# Patient Record
Sex: MALE | Race: OTHER | NOT HISPANIC OR LATINO
[De-identification: names, ages, dates, MRNs, and addresses within clinical notes are randomized per-mention and may not be internally consistent; named-entity substitution may affect disease eponyms.]

---

## 2018-07-09 ENCOUNTER — APPOINTMENT (OUTPATIENT)
Dept: CARDIOLOGY | Facility: CLINIC | Age: 48
End: 2018-07-09

## 2018-07-09 VITALS — SYSTOLIC BLOOD PRESSURE: 140 MMHG | HEART RATE: 72 BPM | DIASTOLIC BLOOD PRESSURE: 100 MMHG | RESPIRATION RATE: 18 BRPM

## 2018-07-09 VITALS — BODY MASS INDEX: 33.04 KG/M2 | HEIGHT: 71 IN | WEIGHT: 236 LBS

## 2018-07-09 DIAGNOSIS — Z87.09 PERSONAL HISTORY OF OTHER DISEASES OF THE RESPIRATORY SYSTEM: ICD-10-CM

## 2018-07-09 DIAGNOSIS — Z78.9 OTHER SPECIFIED HEALTH STATUS: ICD-10-CM

## 2018-07-09 DIAGNOSIS — Z82.49 FAMILY HISTORY OF ISCHEMIC HEART DISEASE AND OTHER DISEASES OF THE CIRCULATORY SYSTEM: ICD-10-CM

## 2018-07-09 DIAGNOSIS — F17.290 NICOTINE DEPENDENCE, OTHER TOBACCO PRODUCT, UNCOMPLICATED: ICD-10-CM

## 2018-07-09 PROBLEM — Z00.00 ENCOUNTER FOR PREVENTIVE HEALTH EXAMINATION: Status: ACTIVE | Noted: 2018-07-09

## 2018-07-16 ENCOUNTER — APPOINTMENT (OUTPATIENT)
Dept: CARDIOLOGY | Facility: CLINIC | Age: 48
End: 2018-07-16

## 2018-08-03 ENCOUNTER — APPOINTMENT (OUTPATIENT)
Dept: CARDIOLOGY | Facility: CLINIC | Age: 48
End: 2018-08-03

## 2018-08-03 ENCOUNTER — TRANSCRIPTION ENCOUNTER (OUTPATIENT)
Age: 48
End: 2018-08-03

## 2018-09-19 ENCOUNTER — APPOINTMENT (OUTPATIENT)
Dept: CARDIOLOGY | Facility: CLINIC | Age: 48
End: 2018-09-19

## 2018-09-19 VITALS — WEIGHT: 242 LBS | BODY MASS INDEX: 33.88 KG/M2 | HEIGHT: 71 IN

## 2018-09-19 VITALS — RESPIRATION RATE: 18 BRPM | SYSTOLIC BLOOD PRESSURE: 130 MMHG | HEART RATE: 88 BPM | DIASTOLIC BLOOD PRESSURE: 86 MMHG

## 2019-02-01 LAB
ALBUMIN SERPL ELPH-MCNC: 4.4 G/DL
ALP BLD-CCNC: 85 U/L
ALT SERPL-CCNC: 36 U/L
ANION GAP SERPL CALC-SCNC: 12 MMOL/L
AST SERPL-CCNC: 26 U/L
BILIRUB SERPL-MCNC: 0.3 MG/DL
BUN SERPL-MCNC: 14 MG/DL
CALCIUM SERPL-MCNC: 9.9 MG/DL
CHLORIDE SERPL-SCNC: 102 MMOL/L
CHOLEST SERPL-MCNC: 235 MG/DL
CHOLEST/HDLC SERPL: 4.2 RATIO
CO2 SERPL-SCNC: 26 MMOL/L
CREAT SERPL-MCNC: 1.08 MG/DL
GLUCOSE SERPL-MCNC: 111 MG/DL
HDLC SERPL-MCNC: 56 MG/DL
LDLC SERPL CALC-MCNC: 166 MG/DL
POTASSIUM SERPL-SCNC: 5 MMOL/L
PROT SERPL-MCNC: 7.5 G/DL
SODIUM SERPL-SCNC: 140 MMOL/L
TRIGL SERPL-MCNC: 65 MG/DL

## 2019-02-04 ENCOUNTER — APPOINTMENT (OUTPATIENT)
Dept: CARDIOLOGY | Facility: CLINIC | Age: 49
End: 2019-02-04

## 2019-02-04 VITALS — DIASTOLIC BLOOD PRESSURE: 80 MMHG | RESPIRATION RATE: 18 BRPM | HEART RATE: 72 BPM | SYSTOLIC BLOOD PRESSURE: 130 MMHG

## 2019-02-04 VITALS — WEIGHT: 237 LBS | BODY MASS INDEX: 33.18 KG/M2 | HEIGHT: 71 IN

## 2019-02-04 NOTE — PHYSICAL EXAM
[General Appearance - Well Developed] : well developed [Normal Appearance] : normal appearance [Well Groomed] : well groomed [General Appearance - Well Nourished] : well nourished [No Deformities] : no deformities [General Appearance - In No Acute Distress] : no acute distress [Normal Conjunctiva] : the conjunctiva exhibited no abnormalities [Eyelids - No Xanthelasma] : the eyelids demonstrated no xanthelasmas [Normal Oral Mucosa] : normal oral mucosa [No Oral Pallor] : no oral pallor [No Oral Cyanosis] : no oral cyanosis [Normal Jugular Venous A Waves Present] : normal jugular venous A waves present [Normal Jugular Venous V Waves Present] : normal jugular venous V waves present [No Jugular Venous Gannon A Waves] : no jugular venous gannon A waves [Respiration, Rhythm And Depth] : normal respiratory rhythm and effort [Exaggerated Use Of Accessory Muscles For Inspiration] : no accessory muscle use [Auscultation Breath Sounds / Voice Sounds] : lungs were clear to auscultation bilaterally [Heart Rate And Rhythm] : heart rate and rhythm were normal [Heart Sounds] : normal S1 and S2 [Murmurs] : no murmurs present [Bowel Sounds] : normal bowel sounds [Abdomen Soft] : soft [Abdomen Tenderness] : non-tender [Abdomen Mass (___ Cm)] : no abdominal mass palpated [Abnormal Walk] : normal gait [Gait - Sufficient For Exercise Testing] : the gait was sufficient for exercise testing [Nail Clubbing] : no clubbing of the fingernails [Cyanosis, Localized] : no localized cyanosis [Petechial Hemorrhages (___cm)] : no petechial hemorrhages [Skin Color & Pigmentation] : normal skin color and pigmentation [] : no rash [No Venous Stasis] : no venous stasis [Skin Lesions] : no skin lesions [No Skin Ulcers] : no skin ulcer [No Xanthoma] : no  xanthoma was observed [Oriented To Time, Place, And Person] : oriented to person, place, and time

## 2019-04-02 ENCOUNTER — TRANSCRIPTION ENCOUNTER (OUTPATIENT)
Age: 49
End: 2019-04-02

## 2019-04-19 ENCOUNTER — MEDICATION RENEWAL (OUTPATIENT)
Age: 49
End: 2019-04-19

## 2019-07-23 ENCOUNTER — APPOINTMENT (OUTPATIENT)
Dept: CARDIOLOGY | Facility: CLINIC | Age: 49
End: 2019-07-23
Payer: COMMERCIAL

## 2019-07-23 VITALS — WEIGHT: 232 LBS | HEIGHT: 71 IN | BODY MASS INDEX: 32.48 KG/M2

## 2019-07-23 VITALS — BODY MASS INDEX: 32.48 KG/M2 | WEIGHT: 232 LBS | HEIGHT: 71 IN

## 2019-07-23 VITALS — RESPIRATION RATE: 18 BRPM | DIASTOLIC BLOOD PRESSURE: 80 MMHG | SYSTOLIC BLOOD PRESSURE: 120 MMHG | HEART RATE: 76 BPM

## 2019-07-23 PROCEDURE — 93000 ELECTROCARDIOGRAM COMPLETE: CPT

## 2019-07-23 PROCEDURE — 99214 OFFICE O/P EST MOD 30 MIN: CPT

## 2019-07-23 NOTE — PHYSICAL EXAM
[General Appearance - Well Developed] : well developed [Normal Appearance] : normal appearance [Well Groomed] : well groomed [General Appearance - Well Nourished] : well nourished [No Deformities] : no deformities [Normal Conjunctiva] : the conjunctiva exhibited no abnormalities [General Appearance - In No Acute Distress] : no acute distress [Eyelids - No Xanthelasma] : the eyelids demonstrated no xanthelasmas [Normal Oral Mucosa] : normal oral mucosa [No Oral Pallor] : no oral pallor [No Oral Cyanosis] : no oral cyanosis [Normal Jugular Venous A Waves Present] : normal jugular venous A waves present [Normal Jugular Venous V Waves Present] : normal jugular venous V waves present [No Jugular Venous Gannon A Waves] : no jugular venous gannon A waves [Heart Rate And Rhythm] : heart rate and rhythm were normal [Heart Sounds] : normal S1 and S2 [Murmurs] : no murmurs present [Respiration, Rhythm And Depth] : normal respiratory rhythm and effort [Exaggerated Use Of Accessory Muscles For Inspiration] : no accessory muscle use [Bowel Sounds] : normal bowel sounds [Auscultation Breath Sounds / Voice Sounds] : lungs were clear to auscultation bilaterally [Abdomen Soft] : soft [Abdomen Mass (___ Cm)] : no abdominal mass palpated [Abdomen Tenderness] : non-tender [Gait - Sufficient For Exercise Testing] : the gait was sufficient for exercise testing [Abnormal Walk] : normal gait [Petechial Hemorrhages (___cm)] : no petechial hemorrhages [Nail Clubbing] : no clubbing of the fingernails [Cyanosis, Localized] : no localized cyanosis [Skin Color & Pigmentation] : normal skin color and pigmentation [] : no rash [No Venous Stasis] : no venous stasis [Skin Lesions] : no skin lesions [No Skin Ulcers] : no skin ulcer [No Xanthoma] : no  xanthoma was observed [Oriented To Time, Place, And Person] : oriented to person, place, and time

## 2019-10-24 ENCOUNTER — APPOINTMENT (OUTPATIENT)
Dept: CARDIOLOGY | Facility: CLINIC | Age: 49
End: 2019-10-24
Payer: COMMERCIAL

## 2019-10-24 VITALS — HEART RATE: 80 BPM | SYSTOLIC BLOOD PRESSURE: 136 MMHG | DIASTOLIC BLOOD PRESSURE: 90 MMHG | RESPIRATION RATE: 18 BRPM

## 2019-10-24 VITALS — HEIGHT: 71 IN | BODY MASS INDEX: 33.74 KG/M2 | WEIGHT: 241 LBS

## 2019-10-24 PROCEDURE — 99213 OFFICE O/P EST LOW 20 MIN: CPT

## 2019-10-24 PROCEDURE — 93000 ELECTROCARDIOGRAM COMPLETE: CPT

## 2019-10-24 RX ORDER — VALSARTAN 80 MG/1
80 TABLET, COATED ORAL DAILY
Qty: 90 | Refills: 3 | Status: DISCONTINUED | COMMUNITY
Start: 1900-01-01 | End: 2019-10-24

## 2019-10-24 NOTE — PHYSICAL EXAM
[Normal Appearance] : normal appearance [General Appearance - Well Developed] : well developed [Well Groomed] : well groomed [General Appearance - Well Nourished] : well nourished [No Deformities] : no deformities [General Appearance - In No Acute Distress] : no acute distress [Normal Conjunctiva] : the conjunctiva exhibited no abnormalities [Eyelids - No Xanthelasma] : the eyelids demonstrated no xanthelasmas [Normal Oral Mucosa] : normal oral mucosa [No Oral Pallor] : no oral pallor [No Oral Cyanosis] : no oral cyanosis [Normal Jugular Venous A Waves Present] : normal jugular venous A waves present [Normal Jugular Venous V Waves Present] : normal jugular venous V waves present [No Jugular Venous Gannon A Waves] : no jugular venous gannon A waves [Heart Rate And Rhythm] : heart rate and rhythm were normal [Heart Sounds] : normal S1 and S2 [Murmurs] : no murmurs present [Respiration, Rhythm And Depth] : normal respiratory rhythm and effort [Auscultation Breath Sounds / Voice Sounds] : lungs were clear to auscultation bilaterally [Exaggerated Use Of Accessory Muscles For Inspiration] : no accessory muscle use [Abdomen Soft] : soft [Abdomen Tenderness] : non-tender [Bowel Sounds] : normal bowel sounds [Abdomen Mass (___ Cm)] : no abdominal mass palpated [Abnormal Walk] : normal gait [Gait - Sufficient For Exercise Testing] : the gait was sufficient for exercise testing [Nail Clubbing] : no clubbing of the fingernails [Cyanosis, Localized] : no localized cyanosis [Petechial Hemorrhages (___cm)] : no petechial hemorrhages [Skin Color & Pigmentation] : normal skin color and pigmentation [Skin Lesions] : no skin lesions [No Venous Stasis] : no venous stasis [] : no rash [Oriented To Time, Place, And Person] : oriented to person, place, and time [No Skin Ulcers] : no skin ulcer [No Xanthoma] : no  xanthoma was observed

## 2019-12-18 ENCOUNTER — APPOINTMENT (OUTPATIENT)
Dept: CARDIOLOGY | Facility: CLINIC | Age: 49
End: 2019-12-18

## 2020-03-16 ENCOUNTER — OUTPATIENT (OUTPATIENT)
Dept: OUTPATIENT SERVICES | Facility: HOSPITAL | Age: 50
LOS: 1 days | Discharge: HOME | End: 2020-03-16
Payer: COMMERCIAL

## 2020-03-16 DIAGNOSIS — R10.10 UPPER ABDOMINAL PAIN, UNSPECIFIED: ICD-10-CM

## 2020-03-16 PROCEDURE — 74176 CT ABD & PELVIS W/O CONTRAST: CPT | Mod: 26

## 2020-04-27 ENCOUNTER — APPOINTMENT (OUTPATIENT)
Dept: NEUROLOGY | Facility: CLINIC | Age: 50
End: 2020-04-27
Payer: COMMERCIAL

## 2020-04-27 DIAGNOSIS — Z83.438 FAMILY HISTORY OF OTHER DISORDER OF LIPOPROTEIN METABOLISM AND OTHER LIPIDEMIA: ICD-10-CM

## 2020-04-27 DIAGNOSIS — Z82.3 FAMILY HISTORY OF STROKE: ICD-10-CM

## 2020-04-27 DIAGNOSIS — Z83.3 FAMILY HISTORY OF DIABETES MELLITUS: ICD-10-CM

## 2020-04-27 PROCEDURE — 99205 OFFICE O/P NEW HI 60 MIN: CPT | Mod: 95

## 2020-04-27 RX ORDER — VALSARTAN AND HYDROCHLOROTHIAZIDE 160; 12.5 MG/1; MG/1
160-12.5 TABLET, FILM COATED ORAL DAILY
Qty: 90 | Refills: 3 | Status: DISCONTINUED | COMMUNITY
Start: 2019-10-24 | End: 2020-04-27

## 2020-04-27 NOTE — ASSESSMENT
[FreeTextEntry1] : 49 year-old RH male referred by Dr. Anshu Hogan for evaluation of venous sinus thrombosis in early April.  Besides for low level intermittent headaches, he has no residual symptoms.  His neurological exam is essentially intact.\par \par Plan:\par 1) Agree with anticoagulation given the thrombotic nature of COVID, even though he's tested negative now.\par - Coumadin/INR monitoring as per his PMD.  Discussed food interactions with coumadin and purpose of INR checking.  Important to maintain INR 2-3.  Reassess need for coumadin after repeat cerebrovascular imaging.\par \par 2) Consider hypercoagulable labs to evaluate his personal risk factors for clotting besides COVID.\par - Awaiting summary of labs sent at [facility].\par \par 3) He should continue to monitor his personal risk factors for stroke including HTN, hyperlipidemia.\par - Deferred to his PMD in regard to antihypertensive regimen.  His blood pressure seems to have reasonable control on Amlodipine.\par - Continue Atorvastatin 40mg for statin.  Would appreciate recent lipid profile for LDL goal less than 70 given his multiple risk factors.\par - Discussed the improvements made to his diet.  Would appreciate recent hemoglobin A1C.\par \par 4) Encouraged daily exercise but cautioned regarding increasing intracranial pressure.  Continue bowel regimen to prevent extra pushing.\par \par Thank you for allowing me to participate in the care of your patient.  If you have any questions, please feel free to call me at [phone].

## 2020-04-27 NOTE — CONSULT LETTER
[Dear  ___] : Dear  [unfilled], [Consult Letter:] : I had the pleasure of evaluating your patient, [unfilled]. [FreeTextEntry2] : Anshu Hogan MD\par [address] [facility]\[place] [address]

## 2020-04-27 NOTE — PHYSICAL EXAM
[FreeTextEntry1] : 125/90 this morning\par \par General: sitting in chair comfortably, NAD\par Head/neck: no tenderness on palpation, FROM\par Eyes: anicteric sclera\par Extremities: FROMx4\par 			\par Neurological exam:	\par Mental status: AA&O x 3, fluent - able name, repeat, spontaneous speech, no dysarthria, follows complex commands across midline, able give full history of present and past events, memory intact, normal attention span, fund of knowledge appropriate\par Cranial nerves: EOMI, pupils equal, VFF, facial sensation intact, no facial droop, hearing grossly intact to finger snap bilaterally, shoulder shrug intact bilaterally, tongue midline\par Motor: No pronator drift, strength full, normal tone and bulk\par Sensory: Intact light touch bilaterally. Negative Romberg\par Cerebellar: Finger-nose intact bilaterally\par Gait: steady\par

## 2020-04-27 NOTE — DATA REVIEWED
[de-identified] : Summary based on reports read over the audiovisual system - \par MRI Brain - filling defects in multiple venous.  Intracerebral veins patent. Right fronto-parietal lobe hyperintensity, possibly edema, positive on DWI..\par MRV Head with phylicia - flow void within superior sagittal sinus - dural venous sinus thrombus

## 2020-04-27 NOTE — HISTORY OF PRESENT ILLNESS
[Home] : at home, [unfilled] , at the time of the visit. [Other Location: e.g. Home (Enter Location, City,State)___] : at [unfilled] [Patient] : the patient [Self] : self [FreeTextEntry2] : Mr. Mock [FreeTextEntry1] : 49 year-old RH male referred by Dr. Anshu Hogan for evaluation of venous sinus thrombosis in early April.  He passed out at home and was taken to [facility] for workup including MRI Brain, MRV Head that showed clot in his venous sinuses.  He denies having any specific symptoms including no weakness, numbness or visual deficits.  He didn't have a headache while in the hospital.  He was started on Lovenox and coumadin.  This was in setting of positive COVID complicated by PNA and kidney issues. He's never had clots before.  He's not sure what types of blood work were done in the hospital.\par \par He's following with his PMD, latest INR 1.8 4/14 with next blood work tomorrow.  \par \par Since discharge, he's had some fatigue with general weakness and sleeps a lot.  He has some stiffness in the joints of both hands and possibly his knees.  He's been having intermittent headaches mostly at his temple, rating 5/10, but no associated symptoms such as nausea or vomiting.  He treats with Tylenol with reasonable resolution.  They are worse at night and when he wakes in the morning.  He hasn't returned to his job at [employer].\par \par PMH: HTN - Valsartan and water pill - Switched recently to Amlodipine due to kidney disease\par - Hyperlipidemia - Atorvastatin 40mg qhs - unchanged since before.  LDL was 160 in 1/2019.\par Denies DM though Hemoglobin A1C was 6.8%in 1/2019.\par - Asthma - Singulair 10mg, \par - Zantac 150mg\par - Diverticulitis - healthier diet with chicken and turkey\par PSH: none\par All: broccoli\par SH: cigar rarely\par FH: dad - CVAs in late 70's, high cholesterol, DM\par - several others with DM, high cholesterol

## 2020-04-28 ENCOUNTER — TRANSCRIPTION ENCOUNTER (OUTPATIENT)
Age: 50
End: 2020-04-28

## 2020-07-01 ENCOUNTER — APPOINTMENT (OUTPATIENT)
Dept: CARDIOLOGY | Facility: CLINIC | Age: 50
End: 2020-07-01
Payer: COMMERCIAL

## 2020-07-01 VITALS — BODY MASS INDEX: 34.44 KG/M2 | WEIGHT: 246 LBS | TEMPERATURE: 98 F | HEIGHT: 71 IN

## 2020-07-01 VITALS — DIASTOLIC BLOOD PRESSURE: 80 MMHG | RESPIRATION RATE: 18 BRPM | SYSTOLIC BLOOD PRESSURE: 130 MMHG | HEART RATE: 92 BPM

## 2020-07-01 PROCEDURE — 93000 ELECTROCARDIOGRAM COMPLETE: CPT

## 2020-07-01 PROCEDURE — 99214 OFFICE O/P EST MOD 30 MIN: CPT

## 2020-07-01 NOTE — PHYSICAL EXAM
[General Appearance - Well Developed] : well developed [Normal Appearance] : normal appearance [Well Groomed] : well groomed [General Appearance - Well Nourished] : well nourished [No Deformities] : no deformities [General Appearance - In No Acute Distress] : no acute distress [Normal Conjunctiva] : the conjunctiva exhibited no abnormalities [Normal Oral Mucosa] : normal oral mucosa [Eyelids - No Xanthelasma] : the eyelids demonstrated no xanthelasmas [Normal Jugular Venous A Waves Present] : normal jugular venous A waves present [No Oral Cyanosis] : no oral cyanosis [No Oral Pallor] : no oral pallor [Normal Jugular Venous V Waves Present] : normal jugular venous V waves present [No Jugular Venous Gannon A Waves] : no jugular venous gannon A waves [Heart Rate And Rhythm] : heart rate and rhythm were normal [Murmurs] : no murmurs present [Heart Sounds] : normal S1 and S2 [Respiration, Rhythm And Depth] : normal respiratory rhythm and effort [Exaggerated Use Of Accessory Muscles For Inspiration] : no accessory muscle use [Auscultation Breath Sounds / Voice Sounds] : lungs were clear to auscultation bilaterally [Abdomen Tenderness] : non-tender [Bowel Sounds] : normal bowel sounds [Abdomen Soft] : soft [Abnormal Walk] : normal gait [Abdomen Mass (___ Cm)] : no abdominal mass palpated [Nail Clubbing] : no clubbing of the fingernails [Gait - Sufficient For Exercise Testing] : the gait was sufficient for exercise testing [Cyanosis, Localized] : no localized cyanosis [Petechial Hemorrhages (___cm)] : no petechial hemorrhages [No Venous Stasis] : no venous stasis [] : no rash [Skin Color & Pigmentation] : normal skin color and pigmentation [No Skin Ulcers] : no skin ulcer [Skin Lesions] : no skin lesions [Oriented To Time, Place, And Person] : oriented to person, place, and time [No Xanthoma] : no  xanthoma was observed

## 2020-07-20 ENCOUNTER — APPOINTMENT (OUTPATIENT)
Dept: NEUROLOGY | Facility: CLINIC | Age: 50
End: 2020-07-20
Payer: COMMERCIAL

## 2020-07-20 VITALS
HEIGHT: 71 IN | HEART RATE: 96 BPM | TEMPERATURE: 97.7 F | OXYGEN SATURATION: 95 % | BODY MASS INDEX: 34.3 KG/M2 | WEIGHT: 245 LBS | DIASTOLIC BLOOD PRESSURE: 94 MMHG | SYSTOLIC BLOOD PRESSURE: 139 MMHG

## 2020-07-20 PROCEDURE — 99214 OFFICE O/P EST MOD 30 MIN: CPT

## 2020-07-20 RX ORDER — WARFARIN SODIUM 5 MG/1
5 TABLET ORAL
Refills: 0 | Status: ACTIVE | COMMUNITY

## 2020-07-20 NOTE — ASSESSMENT
[FreeTextEntry1] : 50 year-old RH male presents for follow up for venous sinus thrombosis in early April.  No residual symptoms.  His neurological exam is essentially intact.\par \par Plan for venous sinus thrombosis:\par 1) Awaiting results of follow up MRV.  The sagital sinus seems to be patent to my eye but awaiting official results from [facility].\par 2) He should continue anticoagulation given the thrombotic nature of COVID.  Coumadin/INR monitoring as per his PMD. \par  \par 3) Discussed purpose of hypercoagulable labs since he doesn't have many typical stroke risk factor.  Would prefer to have them done off anticoagulation so awaiting official results.  If the venous sinus thrombosis has resolved, then would discontinue coumadin and switch to Aspirin.  Would have the labs drawn 4 days later and then reconsider antiplatelet versus anticoagulation depending on the results of the genetic lab work.\par \par 4) Congratulated him on his improvements in regard to HTN, hyperlipidemia and diabetes.  His numbers have improved dramatically with lifestyle adjustments.  \par - Continue Atorvastatin 40mg for statin. \par \par Plan for hand numbness that seems compatible with carpal tunnel syndrome - Recommended trial of wrist splints worn at night to decrease pressure on median nerve.  Discussed anatomy of carpal tunnel and purpose of treatment.

## 2020-07-20 NOTE — HISTORY OF PRESENT ILLNESS
[FreeTextEntry1] : 50 year-old RH male presents for follow up for venous sinus thrombosis in early April.  He's been doing well since last visit with only random headaches.  No weakness in arms or legs.  Minimal numbness in both of his hands 2-3 times=per-week.  The feeling resolves when he shakes his hands.  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  LDL was 160 in 1/2019.\par - Denies DM though Hemoglobin A1C was 6.8%in 1/2019.  He changed his eating habits for no chaudhari foods, etc with improved A1C 4.9%\par \par SH: cigar rarely\par FH: dad - CVAs in late 70's, high cholesterol, DM\par - several others with DM, high cholesterol

## 2020-07-20 NOTE — PHYSICAL EXAM
[FreeTextEntry1] : General: sitting in exam chair comfortably, NAD\par Head/neck: no tenderness on palpation, FROM\par Eyes: anicteric sclera\par Extremities: FROMx4, 2+ radial pulses bilaterally, hardened tendon at lateral left wrist without tenderness\par - reproducible numbness in his finger tips with rotation around his wrists, Tinel's possibly positive on left\par 			\par Neurological exam:	\par Mental status: AA&O x 3, fluent - able name, repeat, spontaneous speech, no dysarthria, follows complex commands across midline, able give full history of present and past events, memory intact, normal attention span, fund of knowledge appropriate\par Cranial nerves: EOMI, pupils equal, VFF, facial sensation intact, no facial droop, hearing grossly intact to finger snap bilaterally, shoulder shrug intact bilaterally, tongue midline\par Motor: No pronator drift, strength full, normal tone and bulk\par Sensory: Intact light touch bilaterally. Negative Romberg\par Cerebellar: Finger-nose intact bilaterally\par Gait: steady\par

## 2020-07-20 NOTE — DATA REVIEWED
[de-identified] : Labs (6/11/2020): \par CBC, CMP - WNL\par Lipid profile: LDL 80, HDL 60, Triglycerides 106, Total 159\par TSH 1.58\par Vitamin 418, folate 16.1\par Hemoglobin A1C 4.9%

## 2020-08-11 ENCOUNTER — APPOINTMENT (OUTPATIENT)
Dept: NEUROLOGY | Facility: CLINIC | Age: 50
End: 2020-08-11

## 2020-08-17 ENCOUNTER — APPOINTMENT (OUTPATIENT)
Dept: NEUROLOGY | Facility: CLINIC | Age: 50
End: 2020-08-17
Payer: COMMERCIAL

## 2020-08-17 DIAGNOSIS — G56.03 CARPAL TUNNEL SYNDROM,BILATERAL UPPER LIMBS: ICD-10-CM

## 2020-08-17 PROCEDURE — 99214 OFFICE O/P EST MOD 30 MIN: CPT | Mod: 95

## 2020-08-17 NOTE — ASSESSMENT
[FreeTextEntry1] : 50 year-old RH male presents for follow up for venous sinus thrombosis in early April.  No residual symptoms besides stiffness and minimal numbness in his hands.  His neurological exam is essentially intact with some question of carpal tunnel syndrome.\par \par Plan for venous sinus thrombosis:\par 1) Reviewed results of his repeat MRV with him with conclusion that the vessels are open but he still has some narrowing within his venous system.  Therefore, recommended that he continue anticoagulation for a full six months and then consider switch to antiplatelet.\par 2) He should follow with his PMD for coumadin/INR monitoring, INR 2-3.\par 3) Ordered hypercoagulable labs to rule out genetic and personal clotting risks, besides COVID.\par - Instructed him to hold coumadin for 3 nights with testing on third day to eliminate interference of anticoagulation with the results of the labs.  He should be more vigilant in terms of stroke symptoms while he's off anticoagulation.\par \par 4) Continue Atorvastatin 40mg for statin. \par 5) Smoking cessation.\par \par Plan for hand numbness that seems compatible with carpal tunnel syndrome - Recommended trial of wrist splints worn at night to decrease pressure on median nerve, rather than it being post stroke.

## 2020-08-17 NOTE — DATA REVIEWED
[No studies available for review at this time.] : No studies available for review at this time. [de-identified] : R

## 2020-08-17 NOTE — PHYSICAL EXAM
[FreeTextEntry1] : General: sitting in exam chair comfortably, NAD\par Head/neck: no tenderness on palpation, FROM\par Eyes: anicteric sclera\par Extremities: FROMx4\par 			\par Neurological exam:	\par Mental status: AA&O x 3, fluent - able name, repeat, spontaneous speech, no dysarthria, follows complex commands across midline, able give full history of present and past events, memory intact, normal attention span, fund of knowledge appropriate\par Cranial nerves: EOMI, pupils equal, VFF, facial sensation intact, no facial droop, tongue midline\par Motor: No pronator drift, strength full, normal tone and bulk\par Sensory: Intact light touch bilaterally\par Cerebellar: Finger-nose intact bilaterally\par Gait: steady\par

## 2020-08-17 NOTE — HISTORY OF PRESENT ILLNESS
[Home] : at home, [unfilled] , at the time of the visit. [Other Location: e.g. Home (Enter Location, City,State)___] : at [unfilled] [Verbal consent obtained from patient] : the patient, [unfilled] [FreeTextEntry1] : 50 year-old RH male presents for follow up for venous sinus thrombosis in early April.  He's doing well with only inconsistent headaches.  No weakness in arms or legs but some stiffness in his hands.  Minimal numbness in both of his hands every night that improve when he moves his hands.  He didn't try splints.  \par \par Coumadin, INRs have been therapeutic at his PMD, last 2.3.\par Atorvastatin - LDL 80 on Atorvastatin 40mg.  General body aches but no specific muscle pains or cramps. 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  LDL was 160 in 1/2019.\par - Denies DM though Hemoglobin A1C was 6.8%in 1/2019.  He changed his eating habits for no chaudhari foods, etc with improved A1C 4.9%\par \par SH: cigar rarely\par FH: dad - CVAs in late 70's, high cholesterol, DM\par - several others with DM, high cholesterol

## 2020-09-15 ENCOUNTER — LABORATORY RESULT (OUTPATIENT)
Age: 50
End: 2020-09-15

## 2020-09-16 ENCOUNTER — APPOINTMENT (OUTPATIENT)
Dept: CT IMAGING | Facility: CLINIC | Age: 50
End: 2020-09-16
Payer: SELF-PAY

## 2020-09-16 ENCOUNTER — OUTPATIENT (OUTPATIENT)
Dept: OUTPATIENT SERVICES | Facility: HOSPITAL | Age: 50
LOS: 1 days | End: 2020-09-16
Payer: COMMERCIAL

## 2020-09-16 ENCOUNTER — APPOINTMENT (OUTPATIENT)
Dept: CARDIOLOGY | Facility: CLINIC | Age: 50
End: 2020-09-16
Payer: COMMERCIAL

## 2020-09-16 ENCOUNTER — RESULT REVIEW (OUTPATIENT)
Age: 50
End: 2020-09-16

## 2020-09-16 VITALS
HEART RATE: 84 BPM | BODY MASS INDEX: 34.16 KG/M2 | DIASTOLIC BLOOD PRESSURE: 82 MMHG | WEIGHT: 244 LBS | HEIGHT: 71 IN | RESPIRATION RATE: 15 BRPM | SYSTOLIC BLOOD PRESSURE: 146 MMHG

## 2020-09-16 DIAGNOSIS — Z86.39 PERSONAL HISTORY OF OTHER ENDOCRINE, NUTRITIONAL AND METABOLIC DISEASE: ICD-10-CM

## 2020-09-16 DIAGNOSIS — Z00.8 ENCOUNTER FOR OTHER GENERAL EXAMINATION: ICD-10-CM

## 2020-09-16 PROCEDURE — ZZZZZ: CPT

## 2020-09-16 PROCEDURE — 93000 ELECTROCARDIOGRAM COMPLETE: CPT

## 2020-09-16 PROCEDURE — 75571 CT HRT W/O DYE W/CA TEST: CPT

## 2020-09-16 PROCEDURE — 93306 TTE W/DOPPLER COMPLETE: CPT

## 2020-09-16 PROCEDURE — 99214 OFFICE O/P EST MOD 30 MIN: CPT

## 2020-09-16 PROCEDURE — 75571 CT HRT W/O DYE W/CA TEST: CPT | Mod: 26

## 2020-09-16 NOTE — PHYSICAL EXAM
[General Appearance - Well Developed] : well developed [Normal Appearance] : normal appearance [Well Groomed] : well groomed [General Appearance - Well Nourished] : well nourished [General Appearance - In No Acute Distress] : no acute distress [No Deformities] : no deformities [Normal Conjunctiva] : the conjunctiva exhibited no abnormalities [Normal Oropharynx] : normal oropharynx [Normal Oral Mucosa] : normal oral mucosa [Normal Jugular Venous A Waves Present] : normal jugular venous A waves present [Normal Jugular Venous V Waves Present] : normal jugular venous V waves present [Normal] : normal [No Jugular Venous Gannon A Waves] : no jugular venous gannon A waves [No Precordial Heave] : no precordial heave was noted [Normal Rate] : normal [Rhythm Regular] : regular [No Gallop] : no gallop heard [II] : a grade 2 [Respiration, Rhythm And Depth] : normal respiratory rhythm and effort [Exaggerated Use Of Accessory Muscles For Inspiration] : no accessory muscle use [Auscultation Breath Sounds / Voice Sounds] : lungs were clear to auscultation bilaterally [Bowel Sounds] : normal bowel sounds [Abdomen Soft] : soft [Abnormal Walk] : normal gait [Nail Clubbing] : no clubbing of the fingernails [Skin Color & Pigmentation] : normal skin color and pigmentation [Cyanosis, Localized] : no localized cyanosis [Skin Turgor] : normal skin turgor [] : no rash [Oriented To Time, Place, And Person] : oriented to person, place, and time [Affect] : the affect was normal [Mood] : the mood was normal [No Anxiety] : not feeling anxious

## 2020-09-16 NOTE — DISCUSSION/SUMMARY
[FreeTextEntry1] : 50 year old male with PMHx of diet-controlled diabetes mellitus, hypertension, COVID-19 infection complicated by PNA, PATTI and CVA (on coumadin) who presents for stress test. \par \par Given his known cerebral venous thrombosis and uncontrolled hypertension, will order pharmacologic nuclear stress test instead of exercise ECG. Will check blood work today including lipid profile. His LDL goal should be less than 70 mg/dL in the setting of his recent stroke. Can increase lipitor pending results of repeat blood work today. Last lipid profile ,HDL 60, LDL 80, .\par \par Patient had echocardiogram in 7/2018 which showed LV EF 55-65%, mild MR, mild TR, redundant anterior mitral leaflet and trace GA. His exercise stress test in 8/2018 which was non-ischemic. \par \par Given his cardiovascular risk, the patient will schedule a pharmacologic nuclear stress test to rule out significant coronary artery disease.\par His cardiac risk factors include hypertension, hyperlipidemia, positive family history of heart disease, and a social cigar smoker.\par Smoking cessation was reinforced.\par The patient had a normal exercise stress test in 2018.\par He also had an echo Doppler examination today to evaluate his left ventricular function, murmur, chamber size, and rule out hypertrophy.  His blood pressure is elevated today and this will be reevaluated in the next month.  He will also schedule a coronary artery calcium score to assess his cardiovascular risk.\par He is instructed to follow-up with his primary care physician and neurologist.  He will follow-up with me after the above-noted diagnostic tests are completed.

## 2020-09-16 NOTE — REASON FOR VISIT
[FreeTextEntry1] : 50 y.o M with PMHx of hypertension, diet controlled DM, hyperlipidemia, Covid-19 infection complicated by cerebral venous sinus thrombosis(on coumadin), PNA and PATTI in March, 2020. He presents today for stress test. He is a  and is going to be retiring in 14 days. He denies any cardiopulmonary symptoms including CP, SOB, palpitations, dizziness or orthopnea. He had a normal stress test in 2018 with his cardiologist Dr. New Garrett. His INR is managed by his PCP. \par \par He smokes cigars on occasion, does not drink etoh. Family history is notable for father with CVA and HTN. Uses his stationary bike for 20-30 minutes every other day.

## 2020-10-18 ENCOUNTER — LABORATORY RESULT (OUTPATIENT)
Age: 50
End: 2020-10-18

## 2020-10-19 ENCOUNTER — APPOINTMENT (OUTPATIENT)
Dept: CARDIOLOGY | Facility: CLINIC | Age: 50
End: 2020-10-19
Payer: COMMERCIAL

## 2020-10-19 VITALS
WEIGHT: 242 LBS | HEART RATE: 84 BPM | SYSTOLIC BLOOD PRESSURE: 130 MMHG | DIASTOLIC BLOOD PRESSURE: 90 MMHG | HEIGHT: 71 IN | BODY MASS INDEX: 33.88 KG/M2 | RESPIRATION RATE: 16 BRPM

## 2020-10-19 PROCEDURE — 99213 OFFICE O/P EST LOW 20 MIN: CPT

## 2020-10-19 PROCEDURE — 99072 ADDL SUPL MATRL&STAF TM PHE: CPT

## 2020-10-20 ENCOUNTER — APPOINTMENT (OUTPATIENT)
Dept: DISASTER EMERGENCY | Facility: CLINIC | Age: 50
End: 2020-10-20

## 2020-10-20 DIAGNOSIS — Z01.818 ENCOUNTER FOR OTHER PREPROCEDURAL EXAMINATION: ICD-10-CM

## 2020-10-21 LAB — SARS-COV-2 N GENE NPH QL NAA+PROBE: NOT DETECTED

## 2020-10-23 ENCOUNTER — OUTPATIENT (OUTPATIENT)
Dept: OUTPATIENT SERVICES | Facility: HOSPITAL | Age: 50
LOS: 1 days | End: 2020-10-23
Payer: COMMERCIAL

## 2020-10-23 VITALS
WEIGHT: 240.08 LBS | RESPIRATION RATE: 16 BRPM | TEMPERATURE: 98 F | HEIGHT: 72 IN | SYSTOLIC BLOOD PRESSURE: 157 MMHG | DIASTOLIC BLOOD PRESSURE: 94 MMHG | HEART RATE: 87 BPM | OXYGEN SATURATION: 99 %

## 2020-10-23 VITALS
HEART RATE: 83 BPM | SYSTOLIC BLOOD PRESSURE: 149 MMHG | RESPIRATION RATE: 16 BRPM | DIASTOLIC BLOOD PRESSURE: 92 MMHG | OXYGEN SATURATION: 96 %

## 2020-10-23 DIAGNOSIS — R94.39 ABNORMAL RESULT OF OTHER CARDIOVASCULAR FUNCTION STUDY: ICD-10-CM

## 2020-10-23 LAB
ANION GAP SERPL CALC-SCNC: 12 MMOL/L — SIGNIFICANT CHANGE UP (ref 5–17)
APTT BLD: 32.9 SEC — SIGNIFICANT CHANGE UP (ref 27.5–35.5)
BUN SERPL-MCNC: 9 MG/DL — SIGNIFICANT CHANGE UP (ref 7–23)
CALCIUM SERPL-MCNC: 9.5 MG/DL — SIGNIFICANT CHANGE UP (ref 8.4–10.5)
CHLORIDE SERPL-SCNC: 102 MMOL/L — SIGNIFICANT CHANGE UP (ref 96–108)
CO2 SERPL-SCNC: 22 MMOL/L — SIGNIFICANT CHANGE UP (ref 22–31)
CREAT SERPL-MCNC: 1.06 MG/DL — SIGNIFICANT CHANGE UP (ref 0.5–1.3)
GLUCOSE SERPL-MCNC: 97 MG/DL — SIGNIFICANT CHANGE UP (ref 70–99)
HCT VFR BLD CALC: 45.8 % — SIGNIFICANT CHANGE UP (ref 39–50)
HGB BLD-MCNC: 14.9 G/DL — SIGNIFICANT CHANGE UP (ref 13–17)
INR BLD: 1.15 RATIO — SIGNIFICANT CHANGE UP (ref 0.88–1.16)
MCHC RBC-ENTMCNC: 28.9 PG — SIGNIFICANT CHANGE UP (ref 27–34)
MCHC RBC-ENTMCNC: 32.5 GM/DL — SIGNIFICANT CHANGE UP (ref 32–36)
MCV RBC AUTO: 88.9 FL — SIGNIFICANT CHANGE UP (ref 80–100)
NRBC # BLD: 0 /100 WBCS — SIGNIFICANT CHANGE UP (ref 0–0)
PLATELET # BLD AUTO: 244 K/UL — SIGNIFICANT CHANGE UP (ref 150–400)
POTASSIUM SERPL-MCNC: 4.4 MMOL/L — SIGNIFICANT CHANGE UP (ref 3.5–5.3)
POTASSIUM SERPL-SCNC: 4.4 MMOL/L — SIGNIFICANT CHANGE UP (ref 3.5–5.3)
PROTHROM AB SERPL-ACNC: 13.7 SEC — HIGH (ref 10.6–13.6)
RBC # BLD: 5.15 M/UL — SIGNIFICANT CHANGE UP (ref 4.2–5.8)
RBC # FLD: 13.2 % — SIGNIFICANT CHANGE UP (ref 10.3–14.5)
SODIUM SERPL-SCNC: 136 MMOL/L — SIGNIFICANT CHANGE UP (ref 135–145)
WBC # BLD: 5.8 K/UL — SIGNIFICANT CHANGE UP (ref 3.8–10.5)
WBC # FLD AUTO: 5.8 K/UL — SIGNIFICANT CHANGE UP (ref 3.8–10.5)

## 2020-10-23 PROCEDURE — 93458 L HRT ARTERY/VENTRICLE ANGIO: CPT | Mod: 26

## 2020-10-23 PROCEDURE — 85730 THROMBOPLASTIN TIME PARTIAL: CPT

## 2020-10-23 PROCEDURE — 85027 COMPLETE CBC AUTOMATED: CPT

## 2020-10-23 PROCEDURE — 93005 ELECTROCARDIOGRAM TRACING: CPT

## 2020-10-23 PROCEDURE — 93010 ELECTROCARDIOGRAM REPORT: CPT

## 2020-10-23 PROCEDURE — C1894: CPT

## 2020-10-23 PROCEDURE — 80048 BASIC METABOLIC PNL TOTAL CA: CPT

## 2020-10-23 PROCEDURE — 93458 L HRT ARTERY/VENTRICLE ANGIO: CPT

## 2020-10-23 PROCEDURE — C1769: CPT

## 2020-10-23 PROCEDURE — 85610 PROTHROMBIN TIME: CPT

## 2020-10-23 PROCEDURE — C1887: CPT

## 2020-10-23 RX ORDER — EZETIMIBE 10 MG/1
1 TABLET ORAL
Qty: 0 | Refills: 0 | DISCHARGE

## 2020-10-23 RX ORDER — ATORVASTATIN CALCIUM 80 MG/1
1 TABLET, FILM COATED ORAL
Qty: 0 | Refills: 0 | DISCHARGE

## 2020-10-23 RX ORDER — WARFARIN SODIUM 2.5 MG/1
1 TABLET ORAL
Qty: 0 | Refills: 0 | DISCHARGE

## 2020-10-23 RX ORDER — AMLODIPINE BESYLATE 2.5 MG/1
1 TABLET ORAL
Qty: 0 | Refills: 0 | DISCHARGE

## 2020-10-23 RX ORDER — IPRATROPIUM BROMIDE 21 MCG
2 AEROSOL, SPRAY (ML) NASAL
Qty: 0 | Refills: 0 | DISCHARGE

## 2020-10-23 RX ORDER — MONTELUKAST 4 MG/1
1 TABLET, CHEWABLE ORAL
Qty: 0 | Refills: 0 | DISCHARGE

## 2020-10-23 NOTE — ASU DISCHARGE PLAN (ADULT/PEDIATRIC) - ASU DC SPECIAL INSTRUCTIONSFT
Wound Care:   the day AFTER your procedure remove bandage GENTLY, and clean using  mild soap and gentle warm, water stream, pat dry. leave OPEN to air. YOU MAY SHOWER   DO NOT apply lotions, creams, ointments, powder, perfumes to your incision site  DO NOT SOAK your site for 1 week ( no baths, no pools, no tubs, etc...)  Check  your groin and /or wrist daily. A small amount of bruising, and soreness are normal    ACTIVITY: for 24 hours   - DO NOT DRIVE  - DO NOT make any important decisions or sign legal documents   - DO NOT operate heavy machineries   - you may resume sexual activity in 48 hours, unless otherwise instructed by your cardiologist     If your procedure was done through the WRIST: for the NEXT 3DAYS:  - avoid pushing, pulling, with that affected wrist   - avoid repeated movement of that hand and wrist ( e.g: typing, hammering)  - DO NOT LIFT anything more than 5 lbs     If your procedure was done through the GROIN: for the NEXT 5 DAYS  - Limit climbing stairs, DO NOT soak in bathtub or pool  - no strenuous activities, pushing, pulling, straining  - Do not lift anything 10lbs or heavier     MEDICATION:   take your medications as explained ( see discharge paperwork)   If you received a STENT, you will be taking antiplatelet medications to KEEP YOUR STENT OPEN ( e.g: Aspirin, Plavix, Brilinta, Effient, etc).  Take as prescribed DO NOT STOP taking them without consulting with your cardiologist first.     Follow heart healthy diet recommended by your doctor, , if you smoke STOP SMOKING ( may call [phone] for center of tobacco control if you need assistance)     CALL your doctor to make appointment in 2 WEEKS     ***CALL YOUR DOCTOR***  if you experience: fever, chills, body aches, or severe pain, swelling, redness, heat or yellow discharge at incision site  If you experience Bleeding or excruciating pain at the procedural site, swelling ( golf ball size) at your procedural site  If you experience CHEST PAIN  If you experience extremity numbness, tingling, temperature change ( of your procedural site)   If you are unable to reach your doctor, you may contact:   -Cardiology Office at [facility] at [phone] or   - [facility] [phone]  - [facility] [phone]

## 2020-10-23 NOTE — H&P CARDIOLOGY - PMH
Asthma, unspecified asthma severity, unspecified whether complicated, unspecified whether persistent    COVID-19    Essential hypertension    Other hyperlipidemia    Type 2 diabetes mellitus without complication, without long-term current use of insulin

## 2020-10-23 NOTE — ASU DISCHARGE PLAN (ADULT/PEDIATRIC) - CARE PROVIDER_API CALL
Jose Wynn  CARDIOLOGY  [address], [address]  [place] [address]  Phone: [phone]  Fax: [phone]  Follow Up Time:

## 2020-10-23 NOTE — H&P CARDIOLOGY - HISTORY OF PRESENT ILLNESS
50 year old  male (no implantable devices) with a PMHx of DM (diet controlled), asthma, HTN, COVID infection (now resolved, complicated by PNA), PATTI, and CVA. Found to have cerebral venous thrombosis (on coumadin, last dose 10/19) in 4/2020. He is a  and is scheduled to retire soon. He was seen and evaluated by his cardiologist where he had a NST. NST was abnormal. He presents today for cardiac cath to evaluate for ischemia. He currently denies any complaints.    Cards: Jose Wynn  PMD: Samira  Neuro: Va

## 2020-11-02 ENCOUNTER — NON-APPOINTMENT (OUTPATIENT)
Age: 50
End: 2020-11-02

## 2020-11-02 ENCOUNTER — APPOINTMENT (OUTPATIENT)
Dept: CARDIOLOGY | Facility: CLINIC | Age: 50
End: 2020-11-02
Payer: COMMERCIAL

## 2020-11-02 VITALS
RESPIRATION RATE: 16 BRPM | HEIGHT: 71 IN | DIASTOLIC BLOOD PRESSURE: 80 MMHG | SYSTOLIC BLOOD PRESSURE: 120 MMHG | BODY MASS INDEX: 34.16 KG/M2 | WEIGHT: 244 LBS | HEART RATE: 72 BPM

## 2020-11-02 DIAGNOSIS — Z87.898 PERSONAL HISTORY OF OTHER SPECIFIED CONDITIONS: ICD-10-CM

## 2020-11-02 DIAGNOSIS — R06.00 DYSPNEA, UNSPECIFIED: ICD-10-CM

## 2020-11-02 PROBLEM — U07.1 COVID-19: Chronic | Status: ACTIVE | Noted: 2020-10-23

## 2020-11-02 PROBLEM — I10 ESSENTIAL (PRIMARY) HYPERTENSION: Chronic | Status: ACTIVE | Noted: 2020-10-23

## 2020-11-02 PROBLEM — J45.909 UNSPECIFIED ASTHMA, UNCOMPLICATED: Chronic | Status: ACTIVE | Noted: 2020-10-23

## 2020-11-02 PROBLEM — E11.9 TYPE 2 DIABETES MELLITUS WITHOUT COMPLICATIONS: Chronic | Status: ACTIVE | Noted: 2020-10-23

## 2020-11-02 PROBLEM — E78.49 OTHER HYPERLIPIDEMIA: Chronic | Status: ACTIVE | Noted: 2020-10-23

## 2020-11-02 PROCEDURE — 99213 OFFICE O/P EST LOW 20 MIN: CPT

## 2020-11-02 PROCEDURE — 99072 ADDL SUPL MATRL&STAF TM PHE: CPT

## 2020-11-02 PROCEDURE — 93000 ELECTROCARDIOGRAM COMPLETE: CPT

## 2020-11-02 RX ORDER — RANITIDINE HYDROCHLORIDE 150 MG/1
150 CAPSULE ORAL
Refills: 0 | Status: COMPLETED | COMMUNITY
End: 2020-11-02

## 2021-01-05 ENCOUNTER — APPOINTMENT (OUTPATIENT)
Dept: CARDIOLOGY | Facility: CLINIC | Age: 51
End: 2021-01-05

## 2021-01-06 ENCOUNTER — APPOINTMENT (OUTPATIENT)
Dept: CARDIOLOGY | Facility: CLINIC | Age: 51
End: 2021-01-06
Payer: COMMERCIAL

## 2021-01-06 VITALS
SYSTOLIC BLOOD PRESSURE: 130 MMHG | HEIGHT: 71 IN | TEMPERATURE: 98.3 F | OXYGEN SATURATION: 98 % | HEART RATE: 77 BPM | DIASTOLIC BLOOD PRESSURE: 84 MMHG | BODY MASS INDEX: 33.88 KG/M2 | WEIGHT: 242 LBS | RESPIRATION RATE: 16 BRPM

## 2021-01-06 DIAGNOSIS — R94.39 ABNORMAL RESULT OF OTHER CARDIOVASCULAR FUNCTION STUDY: ICD-10-CM

## 2021-01-06 PROCEDURE — 99213 OFFICE O/P EST LOW 20 MIN: CPT

## 2021-01-06 PROCEDURE — 99072 ADDL SUPL MATRL&STAF TM PHE: CPT

## 2021-01-06 RX ORDER — LISINOPRIL 10 MG/1
10 TABLET ORAL
Qty: 30 | Refills: 0 | Status: DISCONTINUED | COMMUNITY
Start: 2020-11-18

## 2021-01-06 RX ORDER — METHYLPREDNISOLONE 4 MG/1
4 TABLET ORAL
Qty: 21 | Refills: 0 | Status: DISCONTINUED | COMMUNITY
Start: 2020-12-21

## 2021-01-13 ENCOUNTER — RX CHANGE (OUTPATIENT)
Age: 51
End: 2021-01-13

## 2021-01-20 ENCOUNTER — LABORATORY RESULT (OUTPATIENT)
Age: 51
End: 2021-01-20

## 2021-01-20 ENCOUNTER — APPOINTMENT (OUTPATIENT)
Dept: NEUROLOGY | Facility: CLINIC | Age: 51
End: 2021-01-20
Payer: COMMERCIAL

## 2021-01-20 VITALS
BODY MASS INDEX: 33.04 KG/M2 | TEMPERATURE: 98.2 F | WEIGHT: 236 LBS | HEART RATE: 82 BPM | SYSTOLIC BLOOD PRESSURE: 132 MMHG | OXYGEN SATURATION: 96 % | DIASTOLIC BLOOD PRESSURE: 87 MMHG | HEIGHT: 71 IN

## 2021-01-20 DIAGNOSIS — G08 INTRACRANIAL AND INTRASPINAL PHLEBITIS AND THROMBOPHLEBITIS: ICD-10-CM

## 2021-01-20 PROCEDURE — 99214 OFFICE O/P EST MOD 30 MIN: CPT

## 2021-01-20 PROCEDURE — 99072 ADDL SUPL MATRL&STAF TM PHE: CPT

## 2021-01-20 NOTE — DATA REVIEWED
[de-identified] : Summary based on reports read over the audiovisual system - \par MRI Brain - filling defects in multiple venous.  Intracerebral veins patent. Right fronto-parietal lobe hyperintensity, possibly edema, positive on DWI..\par MRV Head with phylicia - flow void within superior sagittal sinus - dural venous sinus thrombus

## 2021-01-20 NOTE — CONSULT LETTER
[Dear  ___] : Dear  [unfilled], [Consult Letter:] : I had the pleasure of evaluating your patient, [unfilled]. [FreeTextEntry2] : Anshu Hogan MD\par [address]\[place] [address]

## 2021-01-20 NOTE — HISTORY OF PRESENT ILLNESS
[FreeTextEntry1] : Repeat MRV shows resolution of superior sagital sinus clot. has been on coumadin since last april when he had covid and developed venous thrombosis. \par \par \par \par \par \par HPI:49 year-old RH male referred by Dr. Anshu Hogan for evaluation of venous sinus thrombosis in early April.  He passed out at home and was taken to [facility] for workup including MRI Brain, MRV Head that showed clot in his venous sinuses.  He denies having any specific symptoms including no weakness, numbness or visual deficits.  He didn't have a headache while in the hospital.  He was started on Lovenox and coumadin.  This was in setting of positive COVID complicated by PNA and kidney issues. He's never had clots before.  He's not sure what types of blood work were done in the hospital.\par \par He's following with his PMD, latest INR 1.8 4/14 with next blood work tomorrow.  \par \par Since discharge, he's had some fatigue with general weakness and sleeps a lot.  He has some stiffness in the joints of both hands and possibly his knees.  He's been having intermittent headaches mostly at his temple, rating 5/10, but no associated symptoms such as nausea or vomiting.  He treats with Tylenol with reasonable resolution.  They are worse at night and when he wakes in the morning.  He hasn't returned to his job at [employer].\par \par PMH: HTN - Valsartan and water pill - Switched recently to Amlodipine due to kidney disease\par - Hyperlipidemia - Atorvastatin 40mg qhs - unchanged since before.  LDL was 160 in 1/2019.\par Denies DM though Hemoglobin A1C was 6.8%in 1/2019.\par - Asthma - Singulair 10mg, \par - Zantac 150mg\par - Diverticulitis - healthier diet with chicken and turkey\par PSH: none\par All: broccoli\par SH: cigar rarely\par FH: dad - CVAs in late 70's, high cholesterol, DM\par - several others with DM, high cholesterol [Home] : at home, [unfilled] , at the time of the visit. [Other Location: e.g. Home (Enter Location, City,State)___] : at [unfilled] [Self] : self [Patient] : the patient [FreeTextEntry2] : Mr. Mock

## 2021-01-20 NOTE — ASSESSMENT
[FreeTextEntry1] : 49 year-old RH male referred by Dr. Anshu Hogan for evaluation of venous sinus thrombosis in early April.  Besides for low level intermittent headaches, he has no residual symptoms.  His neurological exam is essentially intact.  Venous thrombosis has resolved. continue warfarin until APL AB negative.\par \par Plan:\par Will check APL antibodies that are caused by COVID. If negative will stop AC and start aspirin.

## 2021-01-22 ENCOUNTER — RX CHANGE (OUTPATIENT)
Age: 51
End: 2021-01-22

## 2021-03-07 ENCOUNTER — INPATIENT (INPATIENT)
Facility: HOSPITAL | Age: 51
LOS: 2 days | Discharge: HOME | End: 2021-03-10
Attending: INTERNAL MEDICINE | Admitting: INTERNAL MEDICINE
Payer: COMMERCIAL

## 2021-03-07 VITALS
HEART RATE: 95 BPM | TEMPERATURE: 98 F | OXYGEN SATURATION: 99 % | SYSTOLIC BLOOD PRESSURE: 140 MMHG | DIASTOLIC BLOOD PRESSURE: 91 MMHG | RESPIRATION RATE: 18 BRPM

## 2021-03-07 LAB
ALBUMIN SERPL ELPH-MCNC: 4.3 G/DL — SIGNIFICANT CHANGE UP (ref 3.5–5.2)
ALP SERPL-CCNC: 82 U/L — SIGNIFICANT CHANGE UP (ref 30–115)
ALT FLD-CCNC: 57 U/L — HIGH (ref 0–41)
ANION GAP SERPL CALC-SCNC: 11 MMOL/L — SIGNIFICANT CHANGE UP (ref 7–14)
APTT BLD: 49.5 SEC — HIGH (ref 27–39.2)
AST SERPL-CCNC: 41 U/L — SIGNIFICANT CHANGE UP (ref 0–41)
BASE EXCESS BLDV CALC-SCNC: SIGNIFICANT CHANGE UP MMOL/L (ref -2–2)
BASOPHILS # BLD AUTO: 0.03 K/UL — SIGNIFICANT CHANGE UP (ref 0–0.2)
BASOPHILS NFR BLD AUTO: 0.4 % — SIGNIFICANT CHANGE UP (ref 0–1)
BILIRUB SERPL-MCNC: 0.2 MG/DL — SIGNIFICANT CHANGE UP (ref 0.2–1.2)
BUN SERPL-MCNC: 14 MG/DL — SIGNIFICANT CHANGE UP (ref 10–20)
CALCIUM SERPL-MCNC: 9.7 MG/DL — SIGNIFICANT CHANGE UP (ref 8.5–10.1)
CHLORIDE SERPL-SCNC: 105 MMOL/L — SIGNIFICANT CHANGE UP (ref 98–110)
CO2 SERPL-SCNC: 25 MMOL/L — SIGNIFICANT CHANGE UP (ref 17–32)
CREAT SERPL-MCNC: 1.2 MG/DL — SIGNIFICANT CHANGE UP (ref 0.7–1.5)
EOSINOPHIL # BLD AUTO: 0.08 K/UL — SIGNIFICANT CHANGE UP (ref 0–0.7)
EOSINOPHIL NFR BLD AUTO: 1.2 % — SIGNIFICANT CHANGE UP (ref 0–8)
GLUCOSE SERPL-MCNC: 74 MG/DL — SIGNIFICANT CHANGE UP (ref 70–99)
HCO3 BLDV-SCNC: SIGNIFICANT CHANGE UP MMOL/L (ref 22–29)
HCT VFR BLD CALC: 42.4 % — SIGNIFICANT CHANGE UP (ref 42–52)
HGB BLD-MCNC: 14.3 G/DL — SIGNIFICANT CHANGE UP (ref 14–18)
IMM GRANULOCYTES NFR BLD AUTO: 0.1 % — SIGNIFICANT CHANGE UP (ref 0.1–0.3)
INR BLD: 3.23 RATIO — HIGH (ref 0.65–1.3)
LYMPHOCYTES # BLD AUTO: 2.62 K/UL — SIGNIFICANT CHANGE UP (ref 1.2–3.4)
LYMPHOCYTES # BLD AUTO: 38 % — SIGNIFICANT CHANGE UP (ref 20.5–51.1)
MCHC RBC-ENTMCNC: 29.3 PG — SIGNIFICANT CHANGE UP (ref 27–31)
MCHC RBC-ENTMCNC: 33.7 G/DL — SIGNIFICANT CHANGE UP (ref 32–37)
MCV RBC AUTO: 86.9 FL — SIGNIFICANT CHANGE UP (ref 80–94)
MONOCYTES # BLD AUTO: 1.03 K/UL — HIGH (ref 0.1–0.6)
MONOCYTES NFR BLD AUTO: 14.9 % — HIGH (ref 1.7–9.3)
NEUTROPHILS # BLD AUTO: 3.12 K/UL — SIGNIFICANT CHANGE UP (ref 1.4–6.5)
NEUTROPHILS NFR BLD AUTO: 45.4 % — SIGNIFICANT CHANGE UP (ref 42.2–75.2)
NRBC # BLD: 0 /100 WBCS — SIGNIFICANT CHANGE UP (ref 0–0)
PCO2 BLDV: SIGNIFICANT CHANGE UP MMHG (ref 41–51)
PH BLDV: SIGNIFICANT CHANGE UP (ref 7.26–7.43)
PLATELET # BLD AUTO: 243 K/UL — SIGNIFICANT CHANGE UP (ref 130–400)
PO2 BLDV: SIGNIFICANT CHANGE UP MMHG (ref 20–40)
POTASSIUM SERPL-MCNC: 4.4 MMOL/L — SIGNIFICANT CHANGE UP (ref 3.5–5)
POTASSIUM SERPL-SCNC: 4.4 MMOL/L — SIGNIFICANT CHANGE UP (ref 3.5–5)
PROT SERPL-MCNC: 7.6 G/DL — SIGNIFICANT CHANGE UP (ref 6–8)
PROTHROM AB SERPL-ACNC: 37.2 SEC — HIGH (ref 9.95–12.87)
RAPID RVP RESULT: SIGNIFICANT CHANGE UP
RBC # BLD: 4.88 M/UL — SIGNIFICANT CHANGE UP (ref 4.7–6.1)
RBC # FLD: 13.8 % — SIGNIFICANT CHANGE UP (ref 11.5–14.5)
SAO2 % BLDV: SIGNIFICANT CHANGE UP %
SARS-COV-2 RNA SPEC QL NAA+PROBE: SIGNIFICANT CHANGE UP
SODIUM SERPL-SCNC: 141 MMOL/L — SIGNIFICANT CHANGE UP (ref 135–146)
TROPONIN T SERPL-MCNC: <0.01 NG/ML — SIGNIFICANT CHANGE UP
WBC # BLD: 6.89 K/UL — SIGNIFICANT CHANGE UP (ref 4.8–10.8)
WBC # FLD AUTO: 6.89 K/UL — SIGNIFICANT CHANGE UP (ref 4.8–10.8)

## 2021-03-07 PROCEDURE — 99283 EMERGENCY DEPT VISIT LOW MDM: CPT

## 2021-03-07 PROCEDURE — 93010 ELECTROCARDIOGRAM REPORT: CPT

## 2021-03-07 PROCEDURE — 99223 1ST HOSP IP/OBS HIGH 75: CPT

## 2021-03-07 PROCEDURE — 99285 EMERGENCY DEPT VISIT HI MDM: CPT

## 2021-03-07 PROCEDURE — 70450 CT HEAD/BRAIN W/O DYE: CPT | Mod: 26

## 2021-03-07 RX ORDER — LISINOPRIL 2.5 MG/1
10 TABLET ORAL DAILY
Refills: 0 | Status: DISCONTINUED | OUTPATIENT
Start: 2021-03-07 | End: 2021-03-10

## 2021-03-07 RX ORDER — ATORVASTATIN CALCIUM 80 MG/1
10 TABLET, FILM COATED ORAL AT BEDTIME
Refills: 0 | Status: DISCONTINUED | OUTPATIENT
Start: 2021-03-07 | End: 2021-03-10

## 2021-03-07 RX ORDER — AMLODIPINE BESYLATE 2.5 MG/1
5 TABLET ORAL DAILY
Refills: 0 | Status: DISCONTINUED | OUTPATIENT
Start: 2021-03-07 | End: 2021-03-10

## 2021-03-07 RX ORDER — MONTELUKAST 4 MG/1
10 TABLET, CHEWABLE ORAL AT BEDTIME
Refills: 0 | Status: DISCONTINUED | OUTPATIENT
Start: 2021-03-07 | End: 2021-03-10

## 2021-03-07 RX ORDER — ALBUTEROL 90 UG/1
2 AEROSOL, METERED ORAL EVERY 4 HOURS
Refills: 0 | Status: DISCONTINUED | OUTPATIENT
Start: 2021-03-07 | End: 2021-03-10

## 2021-03-07 RX ORDER — ASPIRIN/CALCIUM CARB/MAGNESIUM 324 MG
81 TABLET ORAL DAILY
Refills: 0 | Status: DISCONTINUED | OUTPATIENT
Start: 2021-03-07 | End: 2021-03-10

## 2021-03-07 RX ORDER — CHLORHEXIDINE GLUCONATE 213 G/1000ML
1 SOLUTION TOPICAL
Refills: 0 | Status: DISCONTINUED | OUTPATIENT
Start: 2021-03-07 | End: 2021-03-10

## 2021-03-07 RX ORDER — LISINOPRIL 2.5 MG/1
1 TABLET ORAL
Qty: 0 | Refills: 0 | DISCHARGE

## 2021-03-07 RX ADMIN — MONTELUKAST 10 MILLIGRAM(S): 4 TABLET, CHEWABLE ORAL at 23:02

## 2021-03-07 RX ADMIN — ATORVASTATIN CALCIUM 10 MILLIGRAM(S): 80 TABLET, FILM COATED ORAL at 23:03

## 2021-03-07 NOTE — ED PROVIDER NOTE - PHYSICAL EXAMINATION
CONSTITUTIONAL: Well-developed; well-nourished; in no acute distress.   SKIN: warm, dry  HEAD: Normocephalic; atraumatic.  EYES: PERRL, EOMI, no conjunctival erythema  ENT: No nasal discharge; airway clear.  NECK: Supple; non tender.  CARD: S1, S2 normal; no murmurs, gallops, or rubs. Regular rate and rhythm.   RESP: No wheezes, rales or rhonchi.  ABD: soft ntnd  EXT: Normal ROM.  No clubbing, cyanosis or edema.   LYMPH: No acute cervical adenopathy.  NEURO: Alert, oriented. CN II - XII intact. No dysdiadochokinesia. Decreased sensation of L. hand. Normal gait. NIHSS = 2.  PSYCH: Cooperative, appropriate.

## 2021-03-07 NOTE — H&P ADULT - ATTENDING COMMENTS
HPI:  50 year old  male with a PMHx of COVID-19 pneumonia in April 2020 complicated by PATTI, CVA (mild cognitive residual effects), venous sinus thrombosis (on Coumadin), DM II (diet controlled), asthma, HTN, HLD presented to the ED with LUE and LLE numbness since 14:30.     He reports that he developed his above symptoms about 14:30, he contacted his PMD who advised if to present to the ED if his symptoms recur and it recurred for about 12 times per patient. He denies any weakness, aphasia, facial asymmetry, chest pain, SOB, N/V.    In ED: Vitals stable. Labs grossly unremarkable. INR 3.23  CT head with no acute intracranial pathology  Code stroke called: NIHSS = 1 (07 Mar 2021 20:45)    REVIEW OF SYSTEMS: see cc/HPI   CONSTITUTIONAL: No weakness, fevers or chills  EYES/ENT: No visual changes;  No vertigo or throat pain   NECK: No pain or stiffness  RESPIRATORY: No cough, wheezing, hemoptysis; No shortness of breath  CARDIOVASCULAR: No chest pain or palpitations  GASTROINTESTINAL: No abdominal or epigastric pain. No nausea, vomiting, or hematemesis; No diarrhea or constipation. No melena or hematochezia.  GENITOURINARY: No dysuria, frequency or hematuria  NEUROLOGICAL: (+)  numbness (+) weakness  SKIN: No itching, rashes    Physical Exam:  General: WN/WD NAD  Neurology: A&Ox3, nonfocal, follows commands  Eyes: PERRLA/ EOMI  ENT/Neck: Neck supple, trachea midline, No JVD  Respiratory: CTA B/L, No wheezing, rales, rhonchi  CV: Normal rate regular rhythm, S1S2, no murmurs, rubs or gallops  Abdominal: Soft, NT, ND +BS,   Extremities: No edema, + peripheral pulses  Skin: No Rashes, Hematoma, Ecchymosis  Incisions: n/a  Tubes: n/a  A/p  Distal LLE numbness and weakness ( locking of joints)  -Admit to stroke unit  -MRI brain w/ and w/o contrast   -ASA, statin   -EEG eval  -check TSH, B12, Folate, fasting lipids   -Neurology eval     Venous sinus thrombus ( on Coumadin)  -Neurology follow up     HTN -stable   -c/w Amlodipine and Lisinopril     DM type II   -Monitoring for blood glucose ( Call MD if > 200)    Asthma -stable HPI:  50 year old  male with a PMHx of COVID-19 pneumonia in April 2020 complicated by PATTI, CVA (mild cognitive residual effects), venous sinus thrombosis (on Coumadin), DM II (diet controlled), asthma, HTN, HLD presented to the ED with LUE and LLE numbness since 14:30.     He reports that he developed his above symptoms about 14:30, he contacted his PMD who advised if to present to the ED if his symptoms recur and it recurred for about 12 times per patient. He denies any weakness, aphasia, facial asymmetry, chest pain, SOB, N/V.    In ED: Vitals stable. Labs grossly unremarkable. INR 3.23  CT head with no acute intracranial pathology  Code stroke called: NIHSS = 1 (07 Mar 2021 20:45)    REVIEW OF SYSTEMS: see cc/HPI   CONSTITUTIONAL: No weakness, fevers or chills  EYES/ENT: No visual changes;  No vertigo or throat pain   NECK: No pain or stiffness  RESPIRATORY: No cough, wheezing, hemoptysis; No shortness of breath  CARDIOVASCULAR: No chest pain or palpitations  GASTROINTESTINAL: No abdominal or epigastric pain. No nausea, vomiting, or hematemesis; No diarrhea or constipation. No melena or hematochezia.  GENITOURINARY: No dysuria, frequency or hematuria  NEUROLOGICAL: (+)  numbness (+) weakness  SKIN: No itching, rashes    Physical Exam:  General: WN/WD NAD  Neurology: A&Ox3, nonfocal, follows commands  Eyes: PERRLA/ EOMI  ENT/Neck: Neck supple, trachea midline, No JVD  Respiratory: CTA B/L, No wheezing, rales, rhonchi  CV: Normal rate regular rhythm, S1S2, no murmurs, rubs or gallops  Abdominal: Soft, NT, ND +BS,   Extremities: No edema, + peripheral pulses  Skin: No Rashes, Hematoma, Ecchymosis  Incisions: n/a  Tubes: n/a  A/p  Distal LLE numbness and weakness ( locking of joints)  -Admit to stroke unit  -MRI brain w/ and w/o contrast   -ASA, statin   -EEG eval  -check TSH, B12, Folate, fasting lipids   -Neurology eval     Venous sinus thrombus ( on Coumadin)  -Neurology follow up     HTN -stable   -c/w Amlodipine and Lisinopril     DM type II   -Monitoring for blood glucose ( Call MD if > 200)    Asthma -stable    DVT prophylaxis - on coumadin

## 2021-03-07 NOTE — H&P ADULT - NSHPLABSRESULTS_GEN_ALL_CORE
Labs:                        14.3   6.89  )-----------( 243      ( 07 Mar 2021 18:18 )             42.4       03-07    141  |  105  |  14  ----------------------------<  74  4.4   |  25  |  1.2    Ca    9.7      07 Mar 2021 18:18    TPro  7.6  /  Alb  4.3  /  TBili  0.2  /  DBili  x   /  AST  41  /  ALT  57<H>  /  AlkPhos  82  03-07       PT/INR - ( 07 Mar 2021 18:18 )   PT: 37.20 sec;   INR: 3.23 ratio      PTT - ( 07 Mar 2021 18:18 )  PTT:49.5 sec    CARDIAC MARKERS ( 07 Mar 2021 18:18 )  x     / <0.01 ng/mL / x     / x     / x          CAPILLARY BLOOD GLUCOSE  98 (07 Mar 2021 19:23)  POCT Blood Glucose.: 98 mg/dL (07 Mar 2021 17:51)    -----    < from: CT Brain Stroke Protocol (03.07.21 @ 18:20) >    IMPRESSION:  No CT evidence of acute intracranial pathology.    < end of copied text >

## 2021-03-07 NOTE — H&P ADULT - NSHPSOCIALHISTORY_GEN_ALL_CORE
Former smoker of cigars (quit about a year ago)  Denies any alcohol or illicit drug use    Retiring

## 2021-03-07 NOTE — ED PROVIDER NOTE - CLINICAL SUMMARY MEDICAL DECISION MAKING FREE TEXT BOX
Pt presented to ED for stroke symptoms. Stroke code called. Labs, imaging obtained and reviewed. Not candidate for tpa or intervention. Will admit to stroke unit.

## 2021-03-07 NOTE — ED PROVIDER NOTE - NS ED ROS FT
Eyes:  No visual changes, eye pain or discharge.  ENMT:  No hearing changes, pain, no sore throat or runny nose, no difficulty swallowing  Cardiac:  No chest pain, SOB or edema. No chest pain with exertion.  Respiratory:  No cough or respiratory distress. No hemoptysis. No history of asthma or RAD.  GI:  No nausea, vomiting, diarrhea or abdominal pain.  :  No dysuria, frequency or burning.  MS:  No myalgia, muscle weakness, joint pain or back pain.  Neuro:  No headache or weakness.  No LOC. + numbness  Skin:  No skin rash.   Endocrine: No history of thyroid disease or diabetes.

## 2021-03-07 NOTE — H&P ADULT - HISTORY OF PRESENT ILLNESS
50 year old  male with a PMHx of COVID-19 pneumonia in April 2020 complicated by PATTI, CVA (mild cognitive residual effects), venous sinus thrombosis (on Coumadin), DM II (diet controlled), asthma, HTN, HLD presented to the ED with LUE and LLE numbness since 14:30.     He reports that he developed his above symptoms about 14:30, he contacted his PMD who advised if to present to the ED if his symptoms recur and it recurred for about 12 times per patient. He denies any weakness, aphasia, facial asymmetry, chest pain, SOB, N/V.    In ED: Vitals stable. Labs grossly unremarkable. INR 3.23  CT head with no acute intracranial pathology  Code stroke called: NIHSS = 1

## 2021-03-07 NOTE — ED ADULT TRIAGE NOTE - CHIEF COMPLAINT QUOTE
pt c/o left hand numbness with tingling to tips of fingers that started today at 1430, spoke with PMD and referred pt to ED for further evaluation, pt has history of stroke from 04/20. On way to ED pt experienced left leg numbness. FS 98 in triage

## 2021-03-07 NOTE — ED ADULT NURSE NOTE - PAIN RATING/NUMBER SCALE (0-10): ACTIVITY
[facility], [facility]          2019        Ora Monique       : 1954  [address] [place] [id number]        To Whom It May Concern,    This is to certify that Ora Monique was seen in the clinic on  2019.    She can return to regular work on 19.        REMARKS/RESTRICTIONS: No heavy lifting until follow up with Occupational Health.        SIGNATURE:_________________________________________, 2019       Dodie Whitney PA-C                                .      [facility]  [facility]  [address] [address]  [phone]   0

## 2021-03-07 NOTE — H&P ADULT - NSICDXPASTMEDICALHX_GEN_ALL_CORE_FT
PAST MEDICAL HISTORY:  Asthma, unspecified asthma severity, unspecified whether complicated, unspecified whether persistent     COVID-19     Essential hypertension     H/O cerebral venous sinus thrombosis     Other hyperlipidemia     Type 2 diabetes mellitus without complication, without long-term current use of insulin

## 2021-03-07 NOTE — H&P ADULT - NSHPPHYSICALEXAM_GEN_ALL_CORE
Vital Signs Last 24 Hrs  T(C): 36.6 (07 Mar 2021 18:00), Max: 36.6 (07 Mar 2021 17:47)  T(F): 97.8 (07 Mar 2021 18:00), Max: 97.8 (07 Mar 2021 17:47)  HR: 90 (07 Mar 2021 20:04) (90 - 95)  BP: 134/88 (07 Mar 2021 20:04) (134/88 - 140/91)  RR: 18 (07 Mar 2021 20:04) (18 - 18)  SpO2: 100% (07 Mar 2021 20:04) (99% - 100%)    ---    PHYSICAL EXAM:  GENERAL: NAD, speaks in full sentences, no signs of respiratory distress  HEAD:  Atraumatic, Normocephalic  EYES: EOMI, PERRLA, anicteric sclera  NECK: Supple, No JVD  CHEST/LUNG: Clear to auscultation bilaterally; No wheeze; No crackles; No accessory muscles used  HEART: Regular rate and rhythm; No murmurs;   ABDOMEN: Soft, Nontender, Nondistended; Bowel sounds present; No guarding  EXTREMITIES:  2+ Peripheral Pulses, No cyanosis or edema  PSYCH: AAOx3  NEUROLOGY: non-focal  SKIN: No rashes or lesions

## 2021-03-07 NOTE — H&P ADULT - ASSESSMENT
50 year old  male with a PMHx of COVID-19 pneumonia in April 2020 complicated by PATTI, CVA (mild cognitive residual effects), venous sinus thrombosis (on Coumadin), DM II (diet controlled), asthma, HTN, HLD presented to the ED with LUE and LLE numbness since 14:30.     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 If APL abs are negative (was elevated during COVID), will discontinue Warfarin and start on Aspirin.  - APL abs resulted negative but patient has not f/u yet with neurologist Dr Calabrese.   - Neurology f/u regarding discontinuing Coumadin  - INR 3.23    # HTN  - Continue Amlodipine, Lisinopril  - DASH diet    # DM II (diet controlled)  - Glucose 74  - Consistent carb diet  - Check A1C    # HLD  - Check lipid profile and calculate ASCVD  - On Atorvastatin and Ezetimibe (non-forumlary)  - Continue Atorvastatin    # Asthma - stable  - Continue Montelukast  - Albuterol inhaler PRN    # Hx of COVID-19 PATTI / ?CKD  - Cr 1.2; eGFR for AA male is 82    GI ppx: N/A  DVT ppx: On coumadin (INR supra-therapeutic)    Dispo: from home. Pending MRI

## 2021-03-07 NOTE — ED PROVIDER NOTE - ATTENDING CONTRIBUTION TO CARE
I personally evaluated the patient. I reviewed the Resident’s or Physician Assistant’s note (as assigned above), and agree with the findings and plan except as documented in my note.    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 Mild. No chest pain, SOB, abd pain, n/v/d. No fever.    Constitutional: Well developed, well nourished. NAD.  Head: Normocephalic, atraumatic.  Eyes: PERRL. EOMI.  ENT: No nasal discharge. Mucous membranes moist.  Neck: Supple. Painless ROM.  Cardiovascular: Normal S1, S2. Regular rate and rhythm. No murmurs, rubs, or gallops.  Pulmonary: Normal respiratory rate and effort. Lungs clear to auscultation bilaterally. No wheezing, rales, or rhonchi.  Abdominal: Soft. Nondistended. Nontender. No rebound, guarding, rigidity.  Extremities. Pelvis stable. No lower extremity edema, symmetric calves.  Skin: No rashes, cyanosis.  Neuro: AAOx3. CN II-XII grossly intact, no facial asymmetry, no slurred speech. Strength 5/5 in upper and lower extremities. Sensation intact in all extremities. FNF testing normal. No pronator drift. Negative Rhombergs test. Normal gait.   Psych: Normal mood. Normal affect.

## 2021-03-07 NOTE — ED PROVIDER NOTE - OBJECTIVE STATEMENT
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 Last known normal at 230pm. States started having cramping of his L. 1st/2nd digit along with numbness. Called his doctor who recommended pt to ER. En route developed LLE numbness as well which resolved when he arrived to hospital. Denies trauma, headache, fever, chills, CP, SOB, abd pain, or weakness.

## 2021-03-07 NOTE — CONSULT NOTE ADULT - SUBJECTIVE AND OBJECTIVE BOX
HPI: 50y man with PMH of COVID-19, asthma, T2DM, HLD, CVA in 2020 with mild cognitive residual effects, presented with numbness and "locked" feeling in his left hand and left foot that had been intermittent (approximately 12 times) since 1430. Of note, patient takes Coumadin for DVTs as well as an ACE Inhibitor for CKD after COVID infection.     LKW: 1430  NIHSS: 1    Baseline MRS: 0  Not a tPA candidate due to NIH < 4, no cortical signs  Not a thrombectomy candidate due to NIH < 4    CT head showed:     < from: CT Brain Stroke Protocol (03.07.21 @ 18:20) >    IMPRESSION:  No CT evidence of acute intracranial pathology.    < end of copied text >      Assessment: 50 year old male with PMHx of CVA, COVID, and DVT (on AC) presented with paresthesias in the left hand and left foot. NIH and Neuro exam significant for mild sensory loss in the left hand and left foot which patient endorses was not a residual aspect of previous CVA in 2020. CT head unremarkable. Patient not a candidate for tPA given the NIH of 1 but likely needs to pursue further neurological workup.        Plan:    Neuro:  - Admit to stroke unit for Q4H neuro exams  - Load 325 ASA then 81 mg Daily  - MRI with and without- Given his new history of CKD, Neurology decided to forgo CTA head and neck to decrease the nephrotoxicity. 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 Of note, patient takes Coumadin for DVTs as well as an ACE Inhibitor for CKD after COVID infection.     LKW: 1430  NIHSS: 1    Baseline MRS: 0  Not a tPA candidate due to NIH < 4, no cortical signs  Not a thrombectomy candidate due to NIH < 4    CT head showed:     < from: CT Brain Stroke Protocol (03.07.21 @ 18:20) >    IMPRESSION:  No CT evidence of acute intracranial pathology.    < end of copied text >      Assessment: 50 year old male with PMHx of CVA, COVID, and DVT (on AC) presented with paresthesias in the left hand and left foot. NIH and Neuro exam significant for mild sensory loss in the left hand and left foot which patient endorses was not a residual aspect of previous CVA in 2020. CT head unremarkable. Patient not a candidate for tPA given the NIH of 1 but likely needs to pursue further neurological workup.        Plan:    Neuro:  - Admit to stroke unit for Q4H neuro exams  - Load 325 ASA then 81 mg Daily  - MRI/MRA H/N- Given his new history of CKD, Neurology decided to forgo CTA head and neck to decrease the nephrotoxicity.   - Routine EEG to r/o seizure as an etiology of paresthesias  - Normotension  - Normovolemia  - Labs: A1c, Lipids, TSH  - Dysphagia screen  - Stroke education  - Atorvastatin daily  - SCDs

## 2021-03-08 LAB
A1C WITH ESTIMATED AVERAGE GLUCOSE RESULT: 7 % — HIGH (ref 4–5.6)
ALBUMIN SERPL ELPH-MCNC: 4 G/DL — SIGNIFICANT CHANGE UP (ref 3.5–5.2)
ALP SERPL-CCNC: 79 U/L — SIGNIFICANT CHANGE UP (ref 30–115)
ALT FLD-CCNC: 98 U/L — HIGH (ref 0–41)
ANION GAP SERPL CALC-SCNC: 10 MMOL/L — SIGNIFICANT CHANGE UP (ref 7–14)
AST SERPL-CCNC: 66 U/L — HIGH (ref 0–41)
BASOPHILS # BLD AUTO: 0.01 K/UL — SIGNIFICANT CHANGE UP (ref 0–0.2)
BASOPHILS NFR BLD AUTO: 0.2 % — SIGNIFICANT CHANGE UP (ref 0–1)
BILIRUB SERPL-MCNC: 0.2 MG/DL — SIGNIFICANT CHANGE UP (ref 0.2–1.2)
BUN SERPL-MCNC: 16 MG/DL — SIGNIFICANT CHANGE UP (ref 10–20)
CALCIUM SERPL-MCNC: 9.1 MG/DL — SIGNIFICANT CHANGE UP (ref 8.5–10.1)
CHLORIDE SERPL-SCNC: 105 MMOL/L — SIGNIFICANT CHANGE UP (ref 98–110)
CHOLEST SERPL-MCNC: 116 MG/DL — SIGNIFICANT CHANGE UP
CK SERPL-CCNC: 389 U/L — HIGH (ref 0–225)
CO2 SERPL-SCNC: 24 MMOL/L — SIGNIFICANT CHANGE UP (ref 17–32)
CREAT SERPL-MCNC: 1 MG/DL — SIGNIFICANT CHANGE UP (ref 0.7–1.5)
EOSINOPHIL # BLD AUTO: 0.15 K/UL — SIGNIFICANT CHANGE UP (ref 0–0.7)
EOSINOPHIL NFR BLD AUTO: 2.7 % — SIGNIFICANT CHANGE UP (ref 0–8)
ESTIMATED AVERAGE GLUCOSE: 154 MG/DL — HIGH (ref 68–114)
GLUCOSE SERPL-MCNC: 89 MG/DL — SIGNIFICANT CHANGE UP (ref 70–99)
HCT VFR BLD CALC: 40.4 % — LOW (ref 42–52)
HDLC SERPL-MCNC: 43 MG/DL — SIGNIFICANT CHANGE UP
HGB BLD-MCNC: 13.4 G/DL — LOW (ref 14–18)
IMM GRANULOCYTES NFR BLD AUTO: 0.2 % — SIGNIFICANT CHANGE UP (ref 0.1–0.3)
INR BLD: 2.47 RATIO — HIGH (ref 0.65–1.3)
LIPID PNL WITH DIRECT LDL SERPL: 58 MG/DL — SIGNIFICANT CHANGE UP
LYMPHOCYTES # BLD AUTO: 2.98 K/UL — SIGNIFICANT CHANGE UP (ref 1.2–3.4)
LYMPHOCYTES # BLD AUTO: 54.4 % — HIGH (ref 20.5–51.1)
MCHC RBC-ENTMCNC: 29.5 PG — SIGNIFICANT CHANGE UP (ref 27–31)
MCHC RBC-ENTMCNC: 33.2 G/DL — SIGNIFICANT CHANGE UP (ref 32–37)
MCV RBC AUTO: 88.8 FL — SIGNIFICANT CHANGE UP (ref 80–94)
MONOCYTES # BLD AUTO: 0.79 K/UL — HIGH (ref 0.1–0.6)
MONOCYTES NFR BLD AUTO: 14.4 % — HIGH (ref 1.7–9.3)
NEUTROPHILS # BLD AUTO: 1.54 K/UL — SIGNIFICANT CHANGE UP (ref 1.4–6.5)
NEUTROPHILS NFR BLD AUTO: 28.1 % — LOW (ref 42.2–75.2)
NON HDL CHOLESTEROL: 73 MG/DL — SIGNIFICANT CHANGE UP
NRBC # BLD: 0 /100 WBCS — SIGNIFICANT CHANGE UP (ref 0–0)
PLATELET # BLD AUTO: 237 K/UL — SIGNIFICANT CHANGE UP (ref 130–400)
POTASSIUM SERPL-MCNC: 4.6 MMOL/L — SIGNIFICANT CHANGE UP (ref 3.5–5)
POTASSIUM SERPL-SCNC: 4.6 MMOL/L — SIGNIFICANT CHANGE UP (ref 3.5–5)
PROT SERPL-MCNC: 7 G/DL — SIGNIFICANT CHANGE UP (ref 6–8)
PROTHROM AB SERPL-ACNC: 28.4 SEC — HIGH (ref 9.95–12.87)
RBC # BLD: 4.55 M/UL — LOW (ref 4.7–6.1)
RBC # FLD: 14 % — SIGNIFICANT CHANGE UP (ref 11.5–14.5)
SODIUM SERPL-SCNC: 139 MMOL/L — SIGNIFICANT CHANGE UP (ref 135–146)
TRIGL SERPL-MCNC: 67 MG/DL — SIGNIFICANT CHANGE UP
TSH SERPL-MCNC: 2.97 UIU/ML — SIGNIFICANT CHANGE UP (ref 0.27–4.2)
WBC # BLD: 5.48 K/UL — SIGNIFICANT CHANGE UP (ref 4.8–10.8)
WBC # FLD AUTO: 5.48 K/UL — SIGNIFICANT CHANGE UP (ref 4.8–10.8)

## 2021-03-08 PROCEDURE — 70553 MRI BRAIN STEM W/O & W/DYE: CPT | Mod: 26

## 2021-03-08 PROCEDURE — 99232 SBSQ HOSP IP/OBS MODERATE 35: CPT

## 2021-03-08 PROCEDURE — 99233 SBSQ HOSP IP/OBS HIGH 50: CPT

## 2021-03-08 RX ORDER — WARFARIN SODIUM 2.5 MG/1
10 TABLET ORAL ONCE
Refills: 0 | Status: COMPLETED | OUTPATIENT
Start: 2021-03-08 | End: 2021-03-08

## 2021-03-08 RX ORDER — ASPIRIN/CALCIUM CARB/MAGNESIUM 324 MG
325 TABLET ORAL ONCE
Refills: 0 | Status: COMPLETED | OUTPATIENT
Start: 2021-03-08 | End: 2021-03-08

## 2021-03-08 RX ADMIN — AMLODIPINE BESYLATE 5 MILLIGRAM(S): 2.5 TABLET ORAL at 05:30

## 2021-03-08 RX ADMIN — Medication 81 MILLIGRAM(S): at 13:15

## 2021-03-08 RX ADMIN — WARFARIN SODIUM 10 MILLIGRAM(S): 2.5 TABLET ORAL at 21:35

## 2021-03-08 RX ADMIN — Medication 325 MILLIGRAM(S): at 01:46

## 2021-03-08 RX ADMIN — ATORVASTATIN CALCIUM 10 MILLIGRAM(S): 80 TABLET, FILM COATED ORAL at 21:35

## 2021-03-08 RX ADMIN — LISINOPRIL 10 MILLIGRAM(S): 2.5 TABLET ORAL at 05:30

## 2021-03-08 RX ADMIN — MONTELUKAST 10 MILLIGRAM(S): 4 TABLET, CHEWABLE ORAL at 21:35

## 2021-03-08 NOTE — PROGRESS NOTE ADULT - SUBJECTIVE AND OBJECTIVE BOX
Neurology Follow up note    Name  SILVINA ARMSTRONG    HPI:  50 year old  male with a PMHx of COVID-19 pneumonia in April 2020 complicated by PATTI, CVA (mild cognitive residual effects), venous sinus thrombosis (on Coumadin), DM II (diet controlled), asthma, HTN, HLD presented to the ED with LUE and LLE numbness since 14:30.     He reports that he developed his above symptoms about 14:30, he contacted his PMD who advised if to present to the ED if his symptoms recur and it recurred for about 12 times per patient. He denies any weakness, aphasia, facial asymmetry, chest pain, SOB, N/V.    In ED: Vitals stable. Labs grossly unremarkable. INR 3.23  CT head with no acute intracranial pathology  Code stroke called: NIHSS = 1 (07 Mar 2021 20:45)      Interval History -Patient seen and examined this AM and no new complaints.  Feels tightness in left arm and leg.  Has some left posterior headaches and neck tightness          Vital Signs Last 24 Hrs  T(C): 36.7 (08 Mar 2021 07:33), Max: 36.7 (08 Mar 2021 07:33)  T(F): 98 (08 Mar 2021 07:33), Max: 98 (08 Mar 2021 07:33)  HR: 77 (08 Mar 2021 07:33) (73 - 95)  BP: 114/75 (08 Mar 2021 07:33) (114/75 - 140/91)  BP(mean): 100 (08 Mar 2021 06:00) (100 - 100)  RR: 18 (08 Mar 2021 07:33) (16 - 18)  SpO2: 99% (08 Mar 2021 07:33) (99% - 100%)  ICU Vital Signs Last 24 Hrs  T(C): 36.7 (08 Mar 2021 07:33), Max: 36.7 (08 Mar 2021 07:33)  T(F): 98 (08 Mar 2021 07:33), Max: 98 (08 Mar 2021 07:33)  HR: 77 (08 Mar 2021 07:33) (73 - 95)  BP: 114/75 (08 Mar 2021 07:33) (114/75 - 140/91)  BP(mean): 100 (08 Mar 2021 06:00) (100 - 100)  ABP: --  ABP(mean): --  RR: 18 (08 Mar 2021 07:33) (16 - 18)  SpO2: 99% (08 Mar 2021 07:33) (99% - 100%)          Neurological Exam:   a+Ox3 language and attention normal  CN 2-12 normal  Power 5/5 except in Left LE 4+/5 proximally  Sensory symmetric except slight decrease in left foot and lower leg  FTN NL  No neglect    NIHSS 1    Medications  ALBUTerol    90 MICROgram(s) HFA Inhaler 2 Puff(s) Inhalation every 4 hours PRN  amLODIPine   Tablet 5 milliGRAM(s) Oral daily  aspirin enteric coated 81 milliGRAM(s) Oral daily  atorvastatin 10 milliGRAM(s) Oral at bedtime  chlorhexidine 4% Liquid 1 Application(s) Topical <User Schedule>  lisinopril 10 milliGRAM(s) Oral daily  montelukast 10 milliGRAM(s) Oral at bedtime      Lab                        13.4   5.48  )-----------( 237      ( 08 Mar 2021 06:07 )             40.4     03-08    139  |  105  |  16  ----------------------------<  89  4.6   |  24  |  1.0    Ca    9.1      08 Mar 2021 06:07    TPro  7.0  /  Alb  4.0  /  TBili  0.2  /  DBili  x   /  AST  66<H>  /  ALT  98<H>  /  AlkPhos  79  03-08    CAPILLARY BLOOD GLUCOSE  98 (07 Mar 2021 19:23)      POCT Blood Glucose.: 98 mg/dL (07 Mar 2021 17:51)    LIVER FUNCTIONS - ( 08 Mar 2021 06:07 )  Alb: 4.0 g/dL / Pro: 7.0 g/dL / ALK PHOS: 79 U/L / ALT: 98 U/L / AST: 66 U/L / GGT: x           PT/INR - ( 08 Mar 2021 06:07 )   PT: 28.40 sec;   INR: 2.47 ratio         PTT - ( 07 Mar 2021 18:18 )  PTT:49.5 sec    Radiology  < from: CT Brain Stroke Protocol (03.07.21 @ 18:20) >  INTERPRETATION:  Clinical History / Reason for exam: Stroke code. Left upper and lower extremity numbness.    Technique: Multiple contiguous axial CT images of the head were obtained from the base of the skull to the vertex without administration of intravenous contrast. Sagittal and coronal reformations were also obtained.    Comparison: None available at this time.    FINDINGS:    The ventricles, basal cisterns and sulcal pattern are within normal limits for patient's stated age.    There is no acute mass effect, midline shift or intracranial hemorrhage.    There is no large territorial infarct.    No evidence of depressed skull fracture.    The visualized paranasal sinuses are well aerated.    The bilateral mastoid complexes, globes and orbits are grossly within normal limits.    IMPRESSION:  No CT evidence of acute intracranial pathology.    Dr. Alexsandra Wilkinson discussed preliminary findings with MELINA MARISCAL on 3/7/2021 6:24 PM with readback.    < end of copied text >      Other studies:     Assessment- This is a 50y year old Male presenting with left sensory dysfunction similar ot prior stroke in 2020.    Plan  1. MRI brain w/o MK; MRA H+N w/o MK  2. Continue coumadin and statin  3. Check LDL, hgba1c  4. PT/OT  5. If MRI brain negative for acute ischemic changes then can follow up as out patient for further workup or can obtain MR cervical spine prior to discharge  6. For now would continue aspirin 81mg QD until MR imaging complete

## 2021-03-08 NOTE — PROGRESS NOTE ADULT - SUBJECTIVE AND OBJECTIVE BOX
SILVINA ARMSTRONG 50y Male  MRN#: [id number]   CODE STATUS: FULL      SUBJECTIVE  Patient is a 50y old Male who presents with a chief complaint of Stroke (08 Mar 2021 11:38)    Currently admitted to medicine with the primary diagnosis of Stroke    Today is hospital day 1d, and this morning he is laying in bed comfortably and reports no overnight events.     OBJECTIVE  PAST MEDICAL & SURGICAL HISTORY  H/O cerebral venous sinus thrombosis    COVID-19    Asthma, unspecified asthma severity, unspecified whether complicated, unspecified whether persistent    Type 2 diabetes mellitus without complication, without long-term current use of insulin    Other hyperlipidemia    Essential hypertension      ALLERGIES:  No Known Allergies    MEDICATIONS:  STANDING MEDICATIONS  amLODIPine   Tablet 5 milliGRAM(s) Oral daily  aspirin enteric coated 81 milliGRAM(s) Oral daily  atorvastatin 10 milliGRAM(s) Oral at bedtime  chlorhexidine 4% Liquid 1 Application(s) Topical <User Schedule>  lisinopril 10 milliGRAM(s) Oral daily  montelukast 10 milliGRAM(s) Oral at bedtime  warfarin 10 milliGRAM(s) Oral once    PRN MEDICATIONS  ALBUTerol    90 MICROgram(s) HFA Inhaler 2 Puff(s) Inhalation every 4 hours PRN      VITAL SIGNS: Last 24 Hours  T(C): 36.7 (08 Mar 2021 07:33), Max: 36.7 (08 Mar 2021 07:33)  T(F): 98 (08 Mar 2021 07:33), Max: 98 (08 Mar 2021 07:33)  HR: 77 (08 Mar 2021 07:33) (73 - 95)  BP: 114/75 (08 Mar 2021 07:33) (114/75 - 140/91)  BP(mean): 100 (08 Mar 2021 06:00) (100 - 100)  RR: 18 (08 Mar 2021 07:33) (16 - 18)  SpO2: 99% (08 Mar 2021 07:33) (99% - 100%)    LABS:                        13.4   5.48  )-----------( 237      ( 08 Mar 2021 06:07 )             40.4     03-08    139  |  105  |  16  ----------------------------<  89  4.6   |  24  |  1.0    Ca    9.1      08 Mar 2021 06:07    TPro  7.0  /  Alb  4.0  /  TBili  0.2  /  DBili  x   /  AST  66<H>  /  ALT  98<H>  /  AlkPhos  79  03-08    PT/INR - ( 08 Mar 2021 06:07 )   PT: 28.40 sec;   INR: 2.47 ratio         PTT - ( 07 Mar 2021 18:18 )  PTT:49.5 sec      Troponin T, Serum: <0.01 ng/mL (03-07-21 @ 18:18)      CARDIAC MARKERS ( 07 Mar 2021 18:18 )  x     / <0.01 ng/mL / x     / x     / x          RADIOLOGY:    < from: MR Head w/wo IV Cont (03.08.21 @ 12:40) >  IMPRESSION:    No evidence of acute intracranial pathology or abnormal intracranial enhancement.    < end of copied text >        < from: CT Brain Stroke Protocol (03.07.21 @ 18:20) >  IMPRESSION:  No CT evidence of acute intracranial pathology.    Dr. Alexsandra Wilkinson discussed preliminary findings with MELNIA MARISCAL on 3/7/2021 6:24 PM with readback.    < end of copied text >      PHYSICAL EXAM:    GENERAL: NAD, well-developed, AAOx3  HEENT:  Atraumatic, Normocephalic. EOMI, conjunctiva and sclera clear, No JVD  PULMONARY: Clear to auscultation bilaterally; No wheeze  CARDIOVASCULAR: Regular rate and rhythm; No murmurs, rubs, or gallops  GASTROINTESTINAL: Soft, Nontender, Nondistended; Bowel sounds present  MUSCULOSKELETAL:  no cyanosis or edema. Left shoulder/arm pain  NEUROLOGY: non-focal  SKIN: No rashes or lesions  Neuro: no focal deficits      ASSESSMENT & PLAN    50 year old  male with a PMHx of COVID-19 pneumonia in April 2020 complicated by PATTI, CVA (mild cognitive residual effects), venous sinus thrombosis (on Coumadin), DM II (diet controlled), asthma, HTN, HLD presented to the ED with LUE and LLE numbness since 14:30.     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 If APL abs are negative (was elevated during COVID), will discontinue Warfarin and start on Aspirin.  - APL abs resulted negative but patient has not f/u yet with neurologist Dr Calabrese.   - Neurology rec continuing coumadin for now  - INR 2.47 today, will give reg home dose coumadin    # HTN  - Continue Amlodipine, Lisinopril  - DASH diet    # DM II- uncontrolled  - A1c 7.0  - not on home meds, has been on modified diet  - monitor FS  - Consistent carb diet    # HLD  - Check lipid profile and calculate ASCVD  - On Atorvastatin and Ezetimibe (non-formulary)  - Continue Atorvastatin, will f/u with neuro regarding dose    # Asthma - stable  - Continue Montelukast  - Albuterol inhaler PRN    # Hx of COVID-19 PATTI / ?CKD  - Cr/GFR stable    GI ppx: N/A  DVT ppx: On coumadin  Diet: DASH, CC  Code Status: Full Code SILVINA ARMSTRONG 50y Male  MRN#: [id number]   CODE STATUS: FULL      SUBJECTIVE  Patient is a 50y old Male who presents with a chief complaint of Stroke (08 Mar 2021 11:38)    Currently admitted to medicine with the primary diagnosis of Stroke    Today is hospital day 1d, and this morning he is laying in bed comfortably and reports no overnight events.     OBJECTIVE  PAST MEDICAL & SURGICAL HISTORY  H/O cerebral venous sinus thrombosis    COVID-19    Asthma, unspecified asthma severity, unspecified whether complicated, unspecified whether persistent    Type 2 diabetes mellitus without complication, without long-term current use of insulin    Other hyperlipidemia    Essential hypertension      ALLERGIES:  No Known Allergies    MEDICATIONS:  STANDING MEDICATIONS  amLODIPine   Tablet 5 milliGRAM(s) Oral daily  aspirin enteric coated 81 milliGRAM(s) Oral daily  atorvastatin 10 milliGRAM(s) Oral at bedtime  chlorhexidine 4% Liquid 1 Application(s) Topical <User Schedule>  lisinopril 10 milliGRAM(s) Oral daily  montelukast 10 milliGRAM(s) Oral at bedtime  warfarin 10 milliGRAM(s) Oral once    PRN MEDICATIONS  ALBUTerol    90 MICROgram(s) HFA Inhaler 2 Puff(s) Inhalation every 4 hours PRN      VITAL SIGNS: Last 24 Hours  T(C): 36.7 (08 Mar 2021 07:33), Max: 36.7 (08 Mar 2021 07:33)  T(F): 98 (08 Mar 2021 07:33), Max: 98 (08 Mar 2021 07:33)  HR: 77 (08 Mar 2021 07:33) (73 - 95)  BP: 114/75 (08 Mar 2021 07:33) (114/75 - 140/91)  BP(mean): 100 (08 Mar 2021 06:00) (100 - 100)  RR: 18 (08 Mar 2021 07:33) (16 - 18)  SpO2: 99% (08 Mar 2021 07:33) (99% - 100%)    LABS:                        13.4   5.48  )-----------( 237      ( 08 Mar 2021 06:07 )             40.4     03-08    139  |  105  |  16  ----------------------------<  89  4.6   |  24  |  1.0    Ca    9.1      08 Mar 2021 06:07    TPro  7.0  /  Alb  4.0  /  TBili  0.2  /  DBili  x   /  AST  66<H>  /  ALT  98<H>  /  AlkPhos  79  03-08    PT/INR - ( 08 Mar 2021 06:07 )   PT: 28.40 sec;   INR: 2.47 ratio         PTT - ( 07 Mar 2021 18:18 )  PTT:49.5 sec      Troponin T, Serum: <0.01 ng/mL (03-07-21 @ 18:18)      CARDIAC MARKERS ( 07 Mar 2021 18:18 )  x     / <0.01 ng/mL / x     / x     / x          RADIOLOGY:    < from: MR Head w/wo IV Cont (03.08.21 @ 12:40) >  IMPRESSION:    No evidence of acute intracranial pathology or abnormal intracranial enhancement.    < end of copied text >        < from: CT Brain Stroke Protocol (03.07.21 @ 18:20) >  IMPRESSION:  No CT evidence of acute intracranial pathology.    Dr. Alexsandra Wilkinson discussed preliminary findings with MELINA MARISCAL on 3/7/2021 6:24 PM with readback.    < end of copied text >      PHYSICAL EXAM:    GENERAL: NAD, well-developed, AAOx3  HEENT:  Atraumatic, Normocephalic. EOMI, conjunctiva and sclera clear, No JVD  PULMONARY: Clear to auscultation bilaterally; No wheeze  CARDIOVASCULAR: Regular rate and rhythm; No murmurs, rubs, or gallops  GASTROINTESTINAL: Soft, Nontender, Nondistended; Bowel sounds present  MUSCULOSKELETAL:  no cyanosis or edema. Left shoulder/arm pain  NEUROLOGY: non-focal  SKIN: No rashes or lesions  Neuro: no focal deficits      ASSESSMENT & PLAN    50 year old  male with a PMHx of COVID-19 pneumonia in April 2020 complicated by PATTI, CVA (mild cognitive residual effects), venous sinus thrombosis (on Coumadin), DM II (diet controlled), asthma, HTN, HLD presented to the ED with LUE and LLE numbness since 14:30.     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 If APL abs are negative (was elevated during COVID), will discontinue Warfarin and start on Aspirin.  - APL abs resulted negative but patient has not f/u yet with neurologist Dr Calabrese.   - Neurology rec continuing coumadin for now  - INR 2.47 today, will give reg home dose coumadin    # HTN  - Continue Amlodipine, Lisinopril  - DASH diet    # DM II- uncontrolled  - A1c 7.0  - not on home meds, has been on modified diet  - monitor FS  - Consistent carb diet    # HLD  - Check lipid profile and calculate ASCVD  - On Atorvastatin and Ezetimibe (non-formulary)  - Continue Atorvastatin, will f/u with neuro regarding dose    # Asthma - stable  - Continue Montelukast  - Albuterol inhaler PRN    # Hx of COVID-19 PATTI   - Cr/GFR stable    GI ppx: N/A  DVT ppx: On coumadin  Diet: DASH, CC  Code Status: Full Code

## 2021-03-08 NOTE — PROGRESS NOTE ADULT - ATTENDING COMMENTS
Pt seen and examined with resident at bedside today. He c/o headache and left sided tightness now.  No numbness or weakness.   motor strength decreased on left side - ? due to pain during exam  MRI of brain negative for infarct or abnormal enhancement  neuro f/u appreciated: check MRA of head and neck and MRI of cervical spine - rule out stenosis/radiculopathy  continue coumadin for now and pt can f/u as outpt to complete APL testing  if MRA negative, then stop ASA  continue statin - h/o CVA    I discussed the case with the resident and I reviewed his note.  Agree with the physical exam, assessment and plan with additions and corrections as above.         PROGRESS NOTE HANDOFF    Pending: MRA of head and neck, MRI of cervical spine    Disposition: home

## 2021-03-09 LAB
ALBUMIN SERPL ELPH-MCNC: 4.1 G/DL — SIGNIFICANT CHANGE UP (ref 3.5–5.2)
ALP SERPL-CCNC: 80 U/L — SIGNIFICANT CHANGE UP (ref 30–115)
ALT FLD-CCNC: 78 U/L — HIGH (ref 0–41)
ANION GAP SERPL CALC-SCNC: 10 MMOL/L — SIGNIFICANT CHANGE UP (ref 7–14)
AST SERPL-CCNC: 48 U/L — HIGH (ref 0–41)
BASOPHILS # BLD AUTO: 0.02 K/UL — SIGNIFICANT CHANGE UP (ref 0–0.2)
BASOPHILS NFR BLD AUTO: 0.4 % — SIGNIFICANT CHANGE UP (ref 0–1)
BILIRUB SERPL-MCNC: 0.4 MG/DL — SIGNIFICANT CHANGE UP (ref 0.2–1.2)
BUN SERPL-MCNC: 12 MG/DL — SIGNIFICANT CHANGE UP (ref 10–20)
CALCIUM SERPL-MCNC: 9.3 MG/DL — SIGNIFICANT CHANGE UP (ref 8.5–10.1)
CHLORIDE SERPL-SCNC: 101 MMOL/L — SIGNIFICANT CHANGE UP (ref 98–110)
CO2 SERPL-SCNC: 25 MMOL/L — SIGNIFICANT CHANGE UP (ref 17–32)
CREAT SERPL-MCNC: 1 MG/DL — SIGNIFICANT CHANGE UP (ref 0.7–1.5)
EOSINOPHIL # BLD AUTO: 0.1 K/UL — SIGNIFICANT CHANGE UP (ref 0–0.7)
EOSINOPHIL NFR BLD AUTO: 1.9 % — SIGNIFICANT CHANGE UP (ref 0–8)
GLUCOSE BLDC GLUCOMTR-MCNC: 130 MG/DL — HIGH (ref 70–99)
GLUCOSE SERPL-MCNC: 101 MG/DL — HIGH (ref 70–99)
HCT VFR BLD CALC: 42.7 % — SIGNIFICANT CHANGE UP (ref 42–52)
HGB BLD-MCNC: 13.7 G/DL — LOW (ref 14–18)
IMM GRANULOCYTES NFR BLD AUTO: 0.2 % — SIGNIFICANT CHANGE UP (ref 0.1–0.3)
INR BLD: 1.91 RATIO — HIGH (ref 0.65–1.3)
LYMPHOCYTES # BLD AUTO: 2.63 K/UL — SIGNIFICANT CHANGE UP (ref 1.2–3.4)
LYMPHOCYTES # BLD AUTO: 50.7 % — SIGNIFICANT CHANGE UP (ref 20.5–51.1)
MAGNESIUM SERPL-MCNC: 2.1 MG/DL — SIGNIFICANT CHANGE UP (ref 1.8–2.4)
MCHC RBC-ENTMCNC: 28.8 PG — SIGNIFICANT CHANGE UP (ref 27–31)
MCHC RBC-ENTMCNC: 32.1 G/DL — SIGNIFICANT CHANGE UP (ref 32–37)
MCV RBC AUTO: 89.9 FL — SIGNIFICANT CHANGE UP (ref 80–94)
MONOCYTES # BLD AUTO: 0.58 K/UL — SIGNIFICANT CHANGE UP (ref 0.1–0.6)
MONOCYTES NFR BLD AUTO: 11.2 % — HIGH (ref 1.7–9.3)
NEUTROPHILS # BLD AUTO: 1.85 K/UL — SIGNIFICANT CHANGE UP (ref 1.4–6.5)
NEUTROPHILS NFR BLD AUTO: 35.6 % — LOW (ref 42.2–75.2)
NRBC # BLD: 0 /100 WBCS — SIGNIFICANT CHANGE UP (ref 0–0)
PHOSPHATE SERPL-MCNC: 2.8 MG/DL — SIGNIFICANT CHANGE UP (ref 2.1–4.9)
PLATELET # BLD AUTO: 232 K/UL — SIGNIFICANT CHANGE UP (ref 130–400)
POTASSIUM SERPL-MCNC: 4.5 MMOL/L — SIGNIFICANT CHANGE UP (ref 3.5–5)
POTASSIUM SERPL-SCNC: 4.5 MMOL/L — SIGNIFICANT CHANGE UP (ref 3.5–5)
PROT SERPL-MCNC: 7.2 G/DL — SIGNIFICANT CHANGE UP (ref 6–8)
PROTHROM AB SERPL-ACNC: 22 SEC — HIGH (ref 9.95–12.87)
RBC # BLD: 4.75 M/UL — SIGNIFICANT CHANGE UP (ref 4.7–6.1)
RBC # FLD: 13.8 % — SIGNIFICANT CHANGE UP (ref 11.5–14.5)
SODIUM SERPL-SCNC: 136 MMOL/L — SIGNIFICANT CHANGE UP (ref 135–146)
WBC # BLD: 5.19 K/UL — SIGNIFICANT CHANGE UP (ref 4.8–10.8)
WBC # FLD AUTO: 5.19 K/UL — SIGNIFICANT CHANGE UP (ref 4.8–10.8)

## 2021-03-09 PROCEDURE — 70544 MR ANGIOGRAPHY HEAD W/O DYE: CPT | Mod: 26

## 2021-03-09 PROCEDURE — 70547 MR ANGIOGRAPHY NECK W/O DYE: CPT | Mod: 26

## 2021-03-09 PROCEDURE — 99221 1ST HOSP IP/OBS SF/LOW 40: CPT

## 2021-03-09 PROCEDURE — 72141 MRI NECK SPINE W/O DYE: CPT | Mod: 26

## 2021-03-09 PROCEDURE — 99233 SBSQ HOSP IP/OBS HIGH 50: CPT

## 2021-03-09 RX ORDER — WARFARIN SODIUM 2.5 MG/1
10 TABLET ORAL ONCE
Refills: 0 | Status: COMPLETED | OUTPATIENT
Start: 2021-03-09 | End: 2021-03-09

## 2021-03-09 RX ADMIN — Medication 81 MILLIGRAM(S): at 11:33

## 2021-03-09 RX ADMIN — AMLODIPINE BESYLATE 5 MILLIGRAM(S): 2.5 TABLET ORAL at 05:27

## 2021-03-09 RX ADMIN — MONTELUKAST 10 MILLIGRAM(S): 4 TABLET, CHEWABLE ORAL at 21:34

## 2021-03-09 RX ADMIN — ATORVASTATIN CALCIUM 10 MILLIGRAM(S): 80 TABLET, FILM COATED ORAL at 21:34

## 2021-03-09 RX ADMIN — WARFARIN SODIUM 10 MILLIGRAM(S): 2.5 TABLET ORAL at 21:34

## 2021-03-09 RX ADMIN — LISINOPRIL 10 MILLIGRAM(S): 2.5 TABLET ORAL at 05:27

## 2021-03-09 NOTE — CONSULT NOTE ADULT - ATTENDING COMMENTS
Patient examined in ER and mild left sensory symptoms noted.   He is on warfarin and has mild nondisabling stroke symptom so TPA not administered.  He is not candidate for neurointervention due to low NIH score.  Vascular imaging will be obtained with brain MRI (MRA head/neck).  Aspirin 325 mg x 1 will be given.  Continue warfarin, check coags.  History of seizure at onset of previous stroke he reports.  This may reflect cardioembolic origin so further assessment for atrial fibrillation with event monitor or loop recorder may be reasonable.
Patient seen and examined.  Imaging reviewed.    MRI cervical spine demonstrates disc osteophytes, chronic changes with stenosis.  There is cord signal change on the right at C5-6-6.  This could represent myelomalacia from an old injury or perhaps inflammatory/demyelinating change.    Nevertheless, surgery is not indicated here.    Follow-up with neurology regarding indications for further work-up with LP if necessary.

## 2021-03-09 NOTE — PROGRESS NOTE ADULT - SUBJECTIVE AND OBJECTIVE BOX
SILVINA ARMSTRONG 50y Male  MRN#: [id number]   CODE STATUS: FULL      SUBJECTIVE  Patient is a 50y old Male who presents with a chief complaint of numbness of upper ext (09 Mar 2021 14:53)    Currently admitted to medicine with the primary diagnosis of Stroke     Today is hospital day 2d, and this morning he is laying in bed comfortably and reports no overnight events.     OBJECTIVE  PAST MEDICAL & SURGICAL HISTORY  H/O cerebral venous sinus thrombosis    COVID-19    Asthma, unspecified asthma severity, unspecified whether complicated, unspecified whether persistent    Type 2 diabetes mellitus without complication, without long-term current use of insulin    Other hyperlipidemia    Essential hypertension      ALLERGIES:  No Known Allergies    MEDICATIONS:  STANDING MEDICATIONS  amLODIPine   Tablet 5 milliGRAM(s) Oral daily  aspirin enteric coated 81 milliGRAM(s) Oral daily  atorvastatin 10 milliGRAM(s) Oral at bedtime  chlorhexidine 4% Liquid 1 Application(s) Topical <User Schedule>  lisinopril 10 milliGRAM(s) Oral daily  montelukast 10 milliGRAM(s) Oral at bedtime  warfarin 10 milliGRAM(s) Oral once    PRN MEDICATIONS  ALBUTerol    90 MICROgram(s) HFA Inhaler 2 Puff(s) Inhalation every 4 hours PRN      VITAL SIGNS: Last 24 Hours  T(C): 36.9 (09 Mar 2021 15:40), Max: 36.9 (09 Mar 2021 15:40)  T(F): 98.5 (09 Mar 2021 15:40), Max: 98.5 (09 Mar 2021 15:40)  HR: 83 (09 Mar 2021 15:40) (72 - 90)  BP: 129/77 (09 Mar 2021 15:40) (111/74 - 144/93)  BP(mean): 88 (09 Mar 2021 12:00) (88 - 113)  RR: 18 (09 Mar 2021 15:40) (16 - 18)  SpO2: 98% (09 Mar 2021 15:40) (98% - 98%)      LABS:                        13.7   5.19  )-----------( 232      ( 09 Mar 2021 07:18 )             42.7     03-09    136  |  101  |  12  ----------------------------<  101<H>  4.5   |  25  |  1.0    Ca    9.3      09 Mar 2021 07:18  Phos  2.8     03-09  Mg     2.1     03-09    TPro  7.2  /  Alb  4.1  /  TBili  0.4  /  DBili  x   /  AST  48<H>  /  ALT  78<H>  /  AlkPhos  80  03-09    PT/INR - ( 09 Mar 2021 07:18 )   PT: 22.00 sec;   INR: 1.91 ratio         PTT - ( 07 Mar 2021 18:18 )  PTT:49.5 sec      Creatine Kinase, Serum: 389 U/L <H> (03-08-21 @ 21:45)      CARDIAC MARKERS ( 08 Mar 2021 21:45 )  x     / x     / 389 U/L / x     / x      CARDIAC MARKERS ( 07 Mar 2021 18:18 )  x     / <0.01 ng/mL / x     / x     / x          RADIOLOGY:    < from: MR Cervical Spine No Cont (03.09.21 @ 09:30) >  IMPRESSION:    Multilevel degenerative changes most significant at the C6-C7 level with resultant severe left neuroforaminal narrowing due to disc bulging and uncinate joint hypertrophy causing impingement upon the exiting C7 nerve root.    Focal cord signal abnormality involving the right lateral aspect of the spinal cord at C5-C6. Findings likely represents myelomalacia versus demyelination. A contrast enhanced examination can be obtained as clinically warranted.    < end of copied text >      < from: MR Angio Neck No Cont (03.09.21 @ 08:45) >  IMPRESSION:    BRAIN MRA  No evidence of flow-limiting stenosis, occlusion or aneurysm.    NECK MRA  No evidence of carotid or vertebral artery stenosis.    < end of copied text >      < from: MR Head w/wo IV Cont (03.08.21 @ 12:40) >  IMPRESSION:    No evidence of acute intracranial pathology or abnormal intracranial enhancement.    < end of copied text >      < from: CT Brain Stroke Protocol (03.07.21 @ 18:20) >  IMPRESSION:  No CT evidence of acute intracranial pathology.    Dr. Alexsandra Wilkinson discussed preliminary findings with MELINA MARISCAL on 3/7/2021 6:24 PM with readback.    < end of copied text >      PHYSICAL EXAM:    GENERAL: NAD, well-developed, AAOx3  HEENT:  Atraumatic, Normocephalic. EOMI, conjunctiva and sclera clear, No JVD  PULMONARY: Clear to auscultation bilaterally; No wheeze  CARDIOVASCULAR: Regular rate and rhythm; No murmurs, rubs, or gallops  GASTROINTESTINAL: Soft, Nontender, Nondistended; Bowel sounds present  MUSCULOSKELETAL:  no cyanosis or edema. Left shoulder/arm pain  NEUROLOGY: non-focal  SKIN: No rashes or lesions  Neuro: no focal deficits      ASSESSMENT & PLAN    50 year old  male with a PMHx of COVID-19 pneumonia in April 2020 complicated by PATTI, CVA (mild cognitive residual effects), venous sinus thrombosis (on Coumadin), DM II (diet controlled), asthma, HTN, HLD presented to the ED with LUE and LLE numbness since 14:30.     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 - occasionally unable to identify objects)  - CT head with no acute intracranial pathology  - MR brain w and w/o contrast neg for acute events  - MRA h/n wnl   - MR cervical spine as above with C7 nerve impingement and C5-C6 myelomalacia versus demyelination; neurosx consulted, awaiting recs  - continue Aspirin, statin; symptoms more likely due to c spine findings rather than stroke  - No CTA per neurology given history of CKD  - rEEG wnl  - A1c 7.0   - Lipids panel wnl  - TSH wnl    # Hx of venous sinus thrombosis (on Coumadin)  - Per neurology outpatient notes (on [facility] Jan 2021): venous sinus thrombus resolved. If APL abs are negative (was elevated during COVID), will discontinue Warfarin and start on Aspirin.  - APL abs resulted negative but patient has not f/u yet with neurologist Dr Calabrese.   - Neurology rec continuing coumadin for now  - INR 1.91 today, will give reg home dose coumadin    # HTN  - Continue Amlodipine, Lisinopril  - DASH diet    # DM II- uncontrolled  - A1c 7.0  - not on home meds, has been on modified diet  - monitor FS  - Consistent carb diet    # HLD  - Check lipid profile and calculate ASCVD  - On Atorvastatin and Ezetimibe (non-formulary)  - Continue Atorvastatin    # Asthma - stable  - Continue Montelukast  - Albuterol inhaler PRN    # Hx of COVID-19 PATTI   - Cr/GFR stable    GI ppx: N/A  DVT ppx: On coumadin  Diet: DASH, CC  Code Status: Full Code

## 2021-03-09 NOTE — PROGRESS NOTE ADULT - SUBJECTIVE AND OBJECTIVE BOX
50 year old  male with a PMHx of COVID-19 pneumonia in April 2020 complicated by PATTI, CVA (mild cognitive residual effects), venous sinus thrombosis (on Coumadin), DM II (diet controlled), asthma, HTN, HLD presented to the ED with LUE and LLE numbness since 14:30.     He reports that he developed his above symptoms about 14:30, he contacted his PMD who advised if to present to the ED if his symptoms recur and it recurred for about 12 times per patient. He denies any weakness, aphasia, facial asymmetry, chest pain, SOB, N/V.    pt is feeling better today     i spoke with his pmd and updated him        Vital Signs Last 24 Hrs  T(C): 36.1 (09 Mar 2021 08:03), Max: 36.7 (08 Mar 2021 15:06)  T(F): 96.9 (09 Mar 2021 08:03), Max: 98.1 (08 Mar 2021 15:06)  HR: 90 (09 Mar 2021 12:00) (72 - 90)  BP: 111/74 (09 Mar 2021 12:00) (111/74 - 144/93)  BP(mean): 88 (09 Mar 2021 12:00) (88 - 113)  RR: 17 (09 Mar 2021 12:00) (16 - 19)  SpO2: 98% (09 Mar 2021 12:00) (96% - 98%)    Physical exam:   constitutional NAD, AAOX3, Respiratory  lungs CTA, CVS heart RRR, GI: abdomen Soft NT, ND, BS+, skin: intact  neuro exam : decreased power on left upper ext ( mild decrease) , decrease sensory in left lower ext.     POCT Blood Glucose.: 130 mg/dL (03-09-21 @ 11:46)                          13.7   5.19  )-----------( 232      ( 09 Mar 2021 07:18 )             42.7     03-09    136  |  101  |  12  ----------------------------<  101<H>  4.5   |  25  |  1.0    Ca    9.3      09 Mar 2021 07:18  Phos  2.8     03-09  Mg     2.1     03-09    TPro  7.2  /  Alb  4.1  /  TBili  0.4  /  DBili  x   /  AST  48<H>  /  ALT  78<H>  /  AlkPhos  80  03-09    CARDIAC MARKERS ( 08 Mar 2021 21:45 )  x     / x     / 389 U/L / x     / x      CARDIAC MARKERS ( 07 Mar 2021 18:18 )  x     / <0.01 ng/mL / x     / x     / x          PT/INR - ( 09 Mar 2021 07:18 )   PT: 22.00 sec;   INR: 1.91 ratio      PTT - ( 07 Mar 2021 18:18 )  PTT:49.5 sec      MEDICATIONS  (STANDING):  amLODIPine   Tablet 5 milliGRAM(s) Oral daily  aspirin enteric coated 81 milliGRAM(s) Oral daily  atorvastatin 10 milliGRAM(s) Oral at bedtime  chlorhexidine 4% Liquid 1 Application(s) Topical <User Schedule>  lisinopril 10 milliGRAM(s) Oral daily  montelukast 10 milliGRAM(s) Oral at bedtime    MEDICATIONS  (PRN):  ALBUTerol    90 MICROgram(s) HFA Inhaler 2 Puff(s) Inhalation every 4 hours PRN Shortness of Breath and/or Wheezing      PAST MEDICAL & SURGICAL HISTORY:  H/O cerebral venous sinus thrombosis    COVID-19    Asthma, unspecified asthma severity, unspecified whether complicated, unspecified whether persistent    Type 2 diabetes mellitus without complication, without long-term current use of insulin    Other hyperlipidemia    Essential hypertension    < from: MR Cervical Spine No Cont (03.09.21 @ 09:30) >    Multilevel degenerative changes most significant at the C6-C7 level with resultant severe left neuroforaminal narrowing due to disc bulging and uncinate joint hypertrophy causing impingement upon the exiting C7 nerve root.    Focal cord signal abnormality involving the right lateral aspect of the spinal cord at C5-C6. Findings likely represents myelomalacia versus demyelination. A contrast enhanced examination can be obtained as clinically warranted.    < end of copied text >    < from: MR Angio Neck No Cont (03.09.21 @ 08:45) >  BRAIN MRA  No evidence of flow-limiting stenosis, occlusion or aneurysm.    NECK MRA  No evidence of carotid or vertebral artery stenosis.    < end of copied text >    < from: MR Head w/wo IV Cont (03.08.21 @ 12:40) >  No evidence of acute intracranial pathology or abnormal intracranial enhancement.    Major intracranial flow-voids are preserved. There is minimal susceptibility-weighted artifact noted in the posterior right parietal cortex likely representing old blood products, series 8 image 19 through 22. No gliosis is seen in this region.    < end of copied text >    < from: EEG (03.08.21 @ 11:00) >  Impression  Normal    < end of copied text >    a/p  # spinal cord impingement and poss demylineation. fu neuro and neurosurgery ,   # hx of sinus venous thrombosis, cont ac. monitor inr, goal 2-3  # HTN cont meds  # DM cont meds monitor fs     full code

## 2021-03-09 NOTE — CONSULT NOTE ADULT - SUBJECTIVE AND OBJECTIVE BOX
HPI:  50 year old  male with a PMHx of COVID-19 pneumonia in April 2020 complicated by PATTI, CVA (mild cognitive residual effects), venous sinus thrombosis (on Coumadin), DM II (diet controlled), asthma, HTN, HLD presented to the ED with LUE and LLE numbness since 14:30.     He reports that he developed his above symptoms about 14:30, he contacted his PMD who advised if to present to the ED if his symptoms recur and it recurred for about 12 times per patient. He denies any weakness, aphasia, facial asymmetry, chest pain, SOB, N/V.    In ED: Vitals stable. Labs grossly unremarkable. INR 3.23  CT head with no acute intracranial pathology  Code stroke called: NIHSS = 1 (07 Mar 2021 20:45)    MRI Brain < from: MR Head w/wo IV Cont (03.08.21 @ 12:40) >  No evidence of acute intracranial pathology or abnormal intracranial enhancement.    < end of copied text >  < from: MR Angio Head No Cont (03.09.21 @ 08:45) >  BRAIN MRA  No evidence of flow-limiting stenosis, occlusion or aneurysm.    NECK MRA  No evidence of carotid or vertebral artery stenosis.      < end of copied text >  < from: MR Cervical Spine No Cont (03.09.21 @ 09:30) >  Multilevel degenerative changes most significant at the C6-C7 level with resultant severe left neuroforaminal narrowing due to disc bulging and uncinate joint hypertrophy causing impingement upon the exiting C7 nerve root.    Focal cord signal abnormality involving the right lateral aspect of the spinal cord at C5-C6. Findings likely represents myelomalacia versus demyelination. A contrast enhanced examination can be obtained as clinically warranted.    < end of copied text >      PAST MEDICAL & SURGICAL HISTORY:  H/O cerebral venous sinus thrombosis    COVID-19    Asthma, unspecified asthma severity, unspecified whether complicated, unspecified whether persistent    Type 2 diabetes mellitus without complication, without long-term current use of insulin    Other hyperlipidemia    Essential hypertension    Home Medications:  amLODIPine 5 mg oral tablet: 1 tab(s) orally once a day (23 Oct 2020 12:35)  atorvastatin 10 mg oral tablet: 1 tab(s) orally once a day (23 Oct 2020 12:35)  ezetimibe 10 mg oral tablet: 1 tab(s) orally once a day (23 Oct 2020 12:35)  ipratropium 21 mcg/inh (0.03%) nasal spray: 2 spray(s) nasal 3 times a day (23 Oct 2020 12:35)  lisinopril 10 mg oral tablet: 1 tab(s) orally once a day (07 Mar 2021 21:54)  montelukast 10 mg oral tablet: 1 tab(s) orally once a day (at bedtime) (23 Oct 2020 12:35)  warfarin 10 mg oral tablet: 1 tab(s) orally once a day  Resume 10/23/20 (23 Oct 2020 13:51)      Allergies    No Known Allergies    Intolerances    ROS:  [X] A ten-point review of systems is negative except as noted   [  ] Due to altered mental status/intubation, subjective information were not able to be obtained from the patient. History was obtained, to the extent possible, from review of the chart and collateral sources of information    MEDICATIONS  (STANDING):  amLODIPine   Tablet 5 milliGRAM(s) Oral daily  aspirin enteric coated 81 milliGRAM(s) Oral daily  atorvastatin 10 milliGRAM(s) Oral at bedtime  chlorhexidine 4% Liquid 1 Application(s) Topical <User Schedule>  lisinopril 10 milliGRAM(s) Oral daily  montelukast 10 milliGRAM(s) Oral at bedtime    MEDICATIONS  (PRN):  ALBUTerol    90 MICROgram(s) HFA Inhaler 2 Puff(s) Inhalation every 4 hours PRN Shortness of Breath and/or Wheezing      ICU Vital Signs Last 24 Hrs  T(C): 36.1 (09 Mar 2021 08:03), Max: 36.7 (08 Mar 2021 15:06)  T(F): 96.9 (09 Mar 2021 08:03), Max: 98.1 (08 Mar 2021 15:06)  HR: 90 (09 Mar 2021 12:00) (72 - 90)  BP: 111/74 (09 Mar 2021 12:00) (111/74 - 144/93)  BP(mean): 88 (09 Mar 2021 12:00) (88 - 113)  ABP: --  ABP(mean): --  RR: 17 (09 Mar 2021 12:00) (16 - 19)  SpO2: 98% (09 Mar 2021 12:00) (96% - 98%)      I&O's Detail      CBC Full  -  ( 09 Mar 2021 07:18 )  WBC Count : 5.19 K/uL  RBC Count : 4.75 M/uL  Hemoglobin : 13.7 g/dL  Hematocrit : 42.7 %  Platelet Count - Automated : 232 K/uL  Mean Cell Volume : 89.9 fL  Mean Cell Hemoglobin : 28.8 pg  Mean Cell Hemoglobin Concentration : 32.1 g/dL  Auto Neutrophil # : 1.85 K/uL  Auto Lymphocyte # : 2.63 K/uL  Auto Monocyte # : 0.58 K/uL  Auto Eosinophil # : 0.10 K/uL  Auto Basophil # : 0.02 K/uL  Auto Neutrophil % : 35.6 %  Auto Lymphocyte % : 50.7 %  Auto Monocyte % : 11.2 %  Auto Eosinophil % : 1.9 %  Auto Basophil % : 0.4 %    03-09    136  |  101  |  12  ----------------------------<  101<H>  4.5   |  25  |  1.0    Ca    9.3      09 Mar 2021 07:18  Phos  2.8     03-09  Mg     2.1     03-09    TPro  7.2  /  Alb  4.1  /  TBili  0.4  /  DBili  x   /  AST  48<H>  /  ALT  78<H>  /  AlkPhos  80  03-09    CARDIAC MARKERS ( 08 Mar 2021 21:45 )  x     / x     / 389 U/L / x     / x      CARDIAC MARKERS ( 07 Mar 2021 18:18 )  x     / <0.01 ng/mL / x     / x     / x                Assessment:  Severe nerve impingement, Myelomalacia vs demyelination    Plan   HPI:  50 year old  male with a PMHx of COVID-19 pneumonia in April 2020 complicated by PATTI, CVA (mild cognitive residual effects), venous sinus thrombosis (on Coumadin), DM II (diet controlled), asthma, HTN, HLD presented to the ED with LUE and LLE numbness since 14:30.     He reports that he developed his above symptoms about 14:30, he contacted his PMD who advised if to present to the ED if his symptoms recur and it recurred for about 12 times per patient. He denies any weakness, aphasia, facial asymmetry, chest pain, SOB, N/V.    In ED: Vitals stable. Labs grossly unremarkable. INR 3.23  CT head with no acute intracranial pathology  Code stroke called: NIHSS = 1 (07 Mar 2021 20:45)    MRI Brain < from: MR Head w/wo IV Cont (03.08.21 @ 12:40) >  No evidence of acute intracranial pathology or abnormal intracranial enhancement.    < end of copied text >  < from: MR Angio Head No Cont (03.09.21 @ 08:45) >  BRAIN MRA  No evidence of flow-limiting stenosis, occlusion or aneurysm.    NECK MRA  No evidence of carotid or vertebral artery stenosis.      < end of copied text >  < from: MR Cervical Spine No Cont (03.09.21 @ 09:30) >  Multilevel degenerative changes most significant at the C6-C7 level with resultant severe left neuroforaminal narrowing due to disc bulging and uncinate joint hypertrophy causing impingement upon the exiting C7 nerve root.    Focal cord signal abnormality involving the right lateral aspect of the spinal cord at C5-C6. Findings likely represents myelomalacia versus demyelination. A contrast enhanced examination can be obtained as clinically warranted.    < end of copied text >      PAST MEDICAL & SURGICAL HISTORY:  H/O cerebral venous sinus thrombosis    COVID-19    Asthma, unspecified asthma severity, unspecified whether complicated, unspecified whether persistent    Type 2 diabetes mellitus without complication, without long-term current use of insulin    Other hyperlipidemia    Essential hypertension    Home Medications:  amLODIPine 5 mg oral tablet: 1 tab(s) orally once a day (23 Oct 2020 12:35)  atorvastatin 10 mg oral tablet: 1 tab(s) orally once a day (23 Oct 2020 12:35)  ezetimibe 10 mg oral tablet: 1 tab(s) orally once a day (23 Oct 2020 12:35)  ipratropium 21 mcg/inh (0.03%) nasal spray: 2 spray(s) nasal 3 times a day (23 Oct 2020 12:35)  lisinopril 10 mg oral tablet: 1 tab(s) orally once a day (07 Mar 2021 21:54)  montelukast 10 mg oral tablet: 1 tab(s) orally once a day (at bedtime) (23 Oct 2020 12:35)  warfarin 10 mg oral tablet: 1 tab(s) orally once a day  Resume 10/23/20 (23 Oct 2020 13:51)      Allergies    No Known Allergies    Intolerances    ROS:  [X] A ten-point review of systems is negative except as noted   [  ] Due to altered mental status/intubation, subjective information were not able to be obtained from the patient. History was obtained, to the extent possible, from review of the chart and collateral sources of information    MEDICATIONS  (STANDING):  amLODIPine   Tablet 5 milliGRAM(s) Oral daily  aspirin enteric coated 81 milliGRAM(s) Oral daily  atorvastatin 10 milliGRAM(s) Oral at bedtime  chlorhexidine 4% Liquid 1 Application(s) Topical <User Schedule>  lisinopril 10 milliGRAM(s) Oral daily  montelukast 10 milliGRAM(s) Oral at bedtime    MEDICATIONS  (PRN):  ALBUTerol    90 MICROgram(s) HFA Inhaler 2 Puff(s) Inhalation every 4 hours PRN Shortness of Breath and/or Wheezing      ICU Vital Signs Last 24 Hrs  T(C): 36.1 (09 Mar 2021 08:03), Max: 36.7 (08 Mar 2021 15:06)  T(F): 96.9 (09 Mar 2021 08:03), Max: 98.1 (08 Mar 2021 15:06)  HR: 90 (09 Mar 2021 12:00) (72 - 90)  BP: 111/74 (09 Mar 2021 12:00) (111/74 - 144/93)  BP(mean): 88 (09 Mar 2021 12:00) (88 - 113)  ABP: --  ABP(mean): --  RR: 17 (09 Mar 2021 12:00) (16 - 19)  SpO2: 98% (09 Mar 2021 12:00) (96% - 98%)      I&O's Detail      CBC Full  -  ( 09 Mar 2021 07:18 )  WBC Count : 5.19 K/uL  RBC Count : 4.75 M/uL  Hemoglobin : 13.7 g/dL  Hematocrit : 42.7 %  Platelet Count - Automated : 232 K/uL  Mean Cell Volume : 89.9 fL  Mean Cell Hemoglobin : 28.8 pg  Mean Cell Hemoglobin Concentration : 32.1 g/dL  Auto Neutrophil # : 1.85 K/uL  Auto Lymphocyte # : 2.63 K/uL  Auto Monocyte # : 0.58 K/uL  Auto Eosinophil # : 0.10 K/uL  Auto Basophil # : 0.02 K/uL  Auto Neutrophil % : 35.6 %  Auto Lymphocyte % : 50.7 %  Auto Monocyte % : 11.2 %  Auto Eosinophil % : 1.9 %  Auto Basophil % : 0.4 %    03-09    136  |  101  |  12  ----------------------------<  101<H>  4.5   |  25  |  1.0    Ca    9.3      09 Mar 2021 07:18  Phos  2.8     03-09  Mg     2.1     03-09    TPro  7.2  /  Alb  4.1  /  TBili  0.4  /  DBili  x   /  AST  48<H>  /  ALT  78<H>  /  AlkPhos  80  03-09    CARDIAC MARKERS ( 08 Mar 2021 21:45 )  x     / x     / 389 U/L / x     / x      CARDIAC MARKERS ( 07 Mar 2021 18:18 )  x     / <0.01 ng/mL / x     / x     / x        On PE:  A&Ox3  PERRL with clear speech  EOMI  No droop  tongue midline  Trigeminal sensation intact  Strength 5/5 x 4  Sensation intact to light touch UE/LE  Mild Right Hoffmans  No left Hoffmans  No drift  Finger to nose intact   No clonus    Assessment:  Severe nerve impingement, Myelomalacia vs demyelination    Plan  Will review films with Attending

## 2021-03-10 ENCOUNTER — TRANSCRIPTION ENCOUNTER (OUTPATIENT)
Age: 51
End: 2021-03-10

## 2021-03-10 VITALS
HEART RATE: 69 BPM | DIASTOLIC BLOOD PRESSURE: 84 MMHG | TEMPERATURE: 97 F | RESPIRATION RATE: 16 BRPM | SYSTOLIC BLOOD PRESSURE: 136 MMHG | OXYGEN SATURATION: 100 %

## 2021-03-10 LAB
ALBUMIN SERPL ELPH-MCNC: 4.1 G/DL — SIGNIFICANT CHANGE UP (ref 3.5–5.2)
ALP SERPL-CCNC: 77 U/L — SIGNIFICANT CHANGE UP (ref 30–115)
ALT FLD-CCNC: 75 U/L — HIGH (ref 0–41)
ANION GAP SERPL CALC-SCNC: 8 MMOL/L — SIGNIFICANT CHANGE UP (ref 7–14)
APTT BLD: 41.9 SEC — HIGH (ref 27–39.2)
AST SERPL-CCNC: 42 U/L — HIGH (ref 0–41)
BASOPHILS # BLD AUTO: 0.01 K/UL — SIGNIFICANT CHANGE UP (ref 0–0.2)
BASOPHILS NFR BLD AUTO: 0.2 % — SIGNIFICANT CHANGE UP (ref 0–1)
BILIRUB SERPL-MCNC: 0.3 MG/DL — SIGNIFICANT CHANGE UP (ref 0.2–1.2)
BUN SERPL-MCNC: 12 MG/DL — SIGNIFICANT CHANGE UP (ref 10–20)
CALCIUM SERPL-MCNC: 9.1 MG/DL — SIGNIFICANT CHANGE UP (ref 8.5–10.1)
CHLORIDE SERPL-SCNC: 103 MMOL/L — SIGNIFICANT CHANGE UP (ref 98–110)
CO2 SERPL-SCNC: 26 MMOL/L — SIGNIFICANT CHANGE UP (ref 17–32)
CREAT SERPL-MCNC: 0.9 MG/DL — SIGNIFICANT CHANGE UP (ref 0.7–1.5)
EOSINOPHIL # BLD AUTO: 0.11 K/UL — SIGNIFICANT CHANGE UP (ref 0–0.7)
EOSINOPHIL NFR BLD AUTO: 1.9 % — SIGNIFICANT CHANGE UP (ref 0–8)
GLUCOSE BLDC GLUCOMTR-MCNC: 80 MG/DL — SIGNIFICANT CHANGE UP (ref 70–99)
GLUCOSE BLDC GLUCOMTR-MCNC: 85 MG/DL — SIGNIFICANT CHANGE UP (ref 70–99)
GLUCOSE SERPL-MCNC: 97 MG/DL — SIGNIFICANT CHANGE UP (ref 70–99)
HCT VFR BLD CALC: 41.2 % — LOW (ref 42–52)
HGB BLD-MCNC: 13.5 G/DL — LOW (ref 14–18)
IMM GRANULOCYTES NFR BLD AUTO: 0.3 % — SIGNIFICANT CHANGE UP (ref 0.1–0.3)
INR BLD: 2.29 RATIO — HIGH (ref 0.65–1.3)
LYMPHOCYTES # BLD AUTO: 3.04 K/UL — SIGNIFICANT CHANGE UP (ref 1.2–3.4)
LYMPHOCYTES # BLD AUTO: 52.4 % — HIGH (ref 20.5–51.1)
MAGNESIUM SERPL-MCNC: 2.1 MG/DL — SIGNIFICANT CHANGE UP (ref 1.8–2.4)
MCHC RBC-ENTMCNC: 29 PG — SIGNIFICANT CHANGE UP (ref 27–31)
MCHC RBC-ENTMCNC: 32.8 G/DL — SIGNIFICANT CHANGE UP (ref 32–37)
MCV RBC AUTO: 88.6 FL — SIGNIFICANT CHANGE UP (ref 80–94)
MONOCYTES # BLD AUTO: 0.62 K/UL — HIGH (ref 0.1–0.6)
MONOCYTES NFR BLD AUTO: 10.7 % — HIGH (ref 1.7–9.3)
NEUTROPHILS # BLD AUTO: 2 K/UL — SIGNIFICANT CHANGE UP (ref 1.4–6.5)
NEUTROPHILS NFR BLD AUTO: 34.5 % — LOW (ref 42.2–75.2)
NRBC # BLD: 0 /100 WBCS — SIGNIFICANT CHANGE UP (ref 0–0)
PLATELET # BLD AUTO: 252 K/UL — SIGNIFICANT CHANGE UP (ref 130–400)
POTASSIUM SERPL-MCNC: 4.6 MMOL/L — SIGNIFICANT CHANGE UP (ref 3.5–5)
POTASSIUM SERPL-SCNC: 4.6 MMOL/L — SIGNIFICANT CHANGE UP (ref 3.5–5)
PROT SERPL-MCNC: 7 G/DL — SIGNIFICANT CHANGE UP (ref 6–8)
PROTHROM AB SERPL-ACNC: 26.3 SEC — HIGH (ref 9.95–12.87)
RBC # BLD: 4.65 M/UL — LOW (ref 4.7–6.1)
RBC # FLD: 13.7 % — SIGNIFICANT CHANGE UP (ref 11.5–14.5)
SODIUM SERPL-SCNC: 137 MMOL/L — SIGNIFICANT CHANGE UP (ref 135–146)
WBC # BLD: 5.8 K/UL — SIGNIFICANT CHANGE UP (ref 4.8–10.8)
WBC # FLD AUTO: 5.8 K/UL — SIGNIFICANT CHANGE UP (ref 4.8–10.8)

## 2021-03-10 PROCEDURE — 99238 HOSP IP/OBS DSCHRG MGMT 30/<: CPT

## 2021-03-10 RX ORDER — ASPIRIN/CALCIUM CARB/MAGNESIUM 324 MG
1 TABLET ORAL
Qty: 30 | Refills: 3
Start: 2021-03-10 | End: 2021-07-07

## 2021-03-10 RX ORDER — ASPIRIN/CALCIUM CARB/MAGNESIUM 324 MG
1 TABLET ORAL
Qty: 0 | Refills: 0 | DISCHARGE
Start: 2021-03-10

## 2021-03-10 RX ADMIN — AMLODIPINE BESYLATE 5 MILLIGRAM(S): 2.5 TABLET ORAL at 06:16

## 2021-03-10 RX ADMIN — LISINOPRIL 10 MILLIGRAM(S): 2.5 TABLET ORAL at 06:16

## 2021-03-10 NOTE — DISCHARGE NOTE PROVIDER - CARE PROVIDER_API CALL
Tk Calle)  EEGEpilepsy; Neurology  [address] [place] [address], [address]  [place] [address]  Phone: [phone]  Fax: [phone]  Follow Up Time:

## 2021-03-10 NOTE — DISCHARGE NOTE PROVIDER - NSDCMRMEDTOKEN_GEN_ALL_CORE_FT
amLODIPine 5 mg oral tablet: 1 tab(s) orally once a day  atorvastatin 10 mg oral tablet: 1 tab(s) orally once a day  ezetimibe 10 mg oral tablet: 1 tab(s) orally once a day  ipratropium 21 mcg/inh (0.03%) nasal spray: 2 spray(s) nasal 3 times a day  lisinopril 10 mg oral tablet: 1 tab(s) orally once a day  montelukast 10 mg oral tablet: 1 tab(s) orally once a day (at bedtime)  warfarin 10 mg oral tablet: 1 tab(s) orally once a day  Resume 10/23/20

## 2021-03-10 NOTE — CHART NOTE - NSCHARTNOTEFT_GEN_A_CORE
<<<RESIDENT DISCHARGE NOTE>>>     SILVINA ARMSTRONG  MRN-[id number]    VITAL SIGNS:  T(F): 97.1 (03-10-21 @ 07:24), Max: 98.5 (03-09-21 @ 15:40)  HR: 69 (03-10-21 @ 07:24)  BP: 136/84 (03-10-21 @ 07:24)  SpO2: 100% (03-10-21 @ 07:24)  Weight (kg): 107.048 (03-10-21 @ 07:20)      GENERAL: NAD, well-developed, AAOx3  HEENT:  Atraumatic, Normocephalic. EOMI, conjunctiva and sclera clear, No JVD  PULMONARY: Clear to auscultation bilaterally; No wheeze  CARDIOVASCULAR: Regular rate and rhythm; No murmurs, rubs, or gallops  GASTROINTESTINAL: Soft, Nontender, Nondistended; Bowel sounds present  MUSCULOSKELETAL:  no cyanosis or edema. Left shoulder/arm pain stable  NEUROLOGY: non-focal  SKIN: No rashes or lesions  Neuro: no focal deficits      TEST RESULTS:                        13.5   5.80  )-----------( 252      ( 10 Mar 2021 05:31 )             41.2       03-10    137  |  103  |  12  ----------------------------<  97  4.6   |  26  |  0.9    Ca    9.1      10 Mar 2021 05:31  Phos  2.8     03-09  Mg     2.1     03-10    TPro  7.0  /  Alb  4.1  /  TBili  0.3  /  DBili  x   /  AST  42<H>  /  ALT  75<H>  /  AlkPhos  77  03-10      FINAL DISCHARGE INTERVIEW:  Resident(s) Present: (Name:___Marvin Fernandez___), RN Present: (Name:  ___________)    DISCHARGE MEDICATION RECONCILIATION  reviewed with Attending (Name:__Dr Nash__)    DISPOSITION:   [ x ] Home,    [  ] Home with Visiting Nursing Services,   [    ]  SNF/ NH,    [   ] Acute Rehab (4A),   [   ] Other (Specify:_________)

## 2021-03-10 NOTE — DISCHARGE NOTE PROVIDER - NSDCCPCAREPLAN_GEN_ALL_CORE_FT
PRINCIPAL DISCHARGE DIAGNOSIS  Diagnosis: Stenosis, cervical spine  Assessment and Plan of Treatment: Spinal stenosis is a narrowing of the spaces within your spine, which can put pressure on the nerves that travel through the spine. Spinal stenosis occurs most often in the lower back and the neck.  Some people with spinal stenosis may not have symptoms. Others may experience pain, tingling, numbness and muscle weakness. Symptoms can worsen over time.  Spinal stenosis is most commonly caused by wear-and-tear changes in the spine related to osteoarthritis. In severe cases of spinal stenosis, doctors may recommend surgery to create additional space for the spinal cord or nerves.  The types of spinal stenosis are classified according to where on the spine the condition occurs. It's possible to have more than one type. The two main types of spinal stenosis are:  Cervical stenosis. In this condition, the narrowing occurs in the part of the spine in your neck.  Many people have evidence of spinal stenosis on an MRI or CT scan but may not have symptoms. When they do occur, they often start gradually and worsen over time. Symptoms vary depending on the location of the stenosis and which nerves are affected.  In the neck (cervical spine)  Numbness or tingling in a hand, arm, foot or leg  Weakness in a hand, arm, foot or leg  Problems with walking and balance  Neck pain  In severe cases, bowel or bladder dysfunction (urinary urgency and incontinence)  To diagnose spinal stenosis, your doctor may ask you about signs and symptoms, discuss your medical history, and conduct a physical examination. He or she may order several imaging tests to help pinpoint the cause of your signs and symptoms.

## 2021-03-10 NOTE — DISCHARGE NOTE PROVIDER - HOSPITAL COURSE
50 year old  male with a PMHx of COVID-19 pneumonia in April 2020 complicated by PATTI, CVA (mild cognitive residual effects), venous sinus thrombosis (on Coumadin), DM II (diet controlled), asthma, HTN, HLD presented to the ED with LUE and LLE numbness since 14:30.     He reports that he developed his above symptoms about 14:30, he contacted his PMD who advised if to present to the ED if his symptoms recur and it recurred for about 12 times per patient. He denies any weakness, aphasia, facial asymmetry, chest pain, SOB, N/V.    In ED: Vitals stable. Labs grossly unremarkable. INR 3.23    Hospital course:   - CT head with no acute intracranial pathology  - MR brain w and w/o contrast neg for acute events  - MRA h/n wnl   - MR cervical spine with C7 nerve impingement and C5-C6 myelomalacia versus demyelination; neurosx consulted, no acute intervention. Neurology ok with o/p w/u fir further management. No further imaging or LP needed in house.  - rEEG wnl  - A1c 7.0   - Lipids panel wnl  - TSH wnl  - continue Aspirin, statin; symptoms more likely due to c spine findings rather than stroke  - rEEG wnl      Medically stable and cleared for DC home with o/p neuro and PMD f/u.

## 2021-03-10 NOTE — DISCHARGE NOTE PROVIDER - INSTRUCTIONS
Please follow diabetic diet.  Eat more vegetables and fruits.  Swap refined grains for whole grains.  Choose fat-free or low-fat dairy products.  Choose lean protein sources like fish, poultry and beans.  Cook with vegetable oils.  Limit your intake of foods high in added sugars, like soda and candy.

## 2021-03-10 NOTE — DISCHARGE NOTE PROVIDER - NSDCFUADDINST_GEN_ALL_CORE_FT
Please follow up with your regular doctor within a week for further care, diabetes management, and health maintenance.    Please follow up with neurology in two weeks for further care.

## 2021-03-10 NOTE — DISCHARGE NOTE NURSING/CASE MANAGEMENT/SOCIAL WORK - PATIENT PORTAL LINK FT
You can access the FollowMyHealth Patient Portal offered by [facility] by registering at the following website: [email or web address]. By joining [employer]’s FollowMyHealth portal, you will also be able to view your health information using other applications (apps) compatible with our system.

## 2021-03-15 ENCOUNTER — RX RENEWAL (OUTPATIENT)
Age: 51
End: 2021-03-15

## 2021-03-17 DIAGNOSIS — I10 ESSENTIAL (PRIMARY) HYPERTENSION: ICD-10-CM

## 2021-03-17 DIAGNOSIS — E11.9 TYPE 2 DIABETES MELLITUS WITHOUT COMPLICATIONS: ICD-10-CM

## 2021-03-17 DIAGNOSIS — B94.8 SEQUELAE OF OTHER SPECIFIED INFECTIOUS AND PARASITIC DISEASES: ICD-10-CM

## 2021-03-17 DIAGNOSIS — Z87.891 PERSONAL HISTORY OF NICOTINE DEPENDENCE: ICD-10-CM

## 2021-03-17 DIAGNOSIS — I63.9 CEREBRAL INFARCTION, UNSPECIFIED: ICD-10-CM

## 2021-03-17 DIAGNOSIS — I69.919 UNSPECIFIED SYMPTOMS AND SIGNS INVOLVING COGNITIVE FUNCTIONS FOLLOWING UNSPECIFIED CEREBROVASCULAR DISEASE: ICD-10-CM

## 2021-03-17 DIAGNOSIS — Z86.718 PERSONAL HISTORY OF OTHER VENOUS THROMBOSIS AND EMBOLISM: ICD-10-CM

## 2021-03-17 DIAGNOSIS — E78.5 HYPERLIPIDEMIA, UNSPECIFIED: ICD-10-CM

## 2021-03-17 DIAGNOSIS — J45.909 UNSPECIFIED ASTHMA, UNCOMPLICATED: ICD-10-CM

## 2021-03-17 DIAGNOSIS — M48.02 SPINAL STENOSIS, CERVICAL REGION: ICD-10-CM

## 2021-03-17 DIAGNOSIS — Z79.01 LONG TERM (CURRENT) USE OF ANTICOAGULANTS: ICD-10-CM

## 2021-04-05 ENCOUNTER — APPOINTMENT (OUTPATIENT)
Dept: CARDIOLOGY | Facility: CLINIC | Age: 51
End: 2021-04-05

## 2021-08-23 ENCOUNTER — TRANSCRIPTION ENCOUNTER (OUTPATIENT)
Age: 51
End: 2021-08-23

## 2021-08-23 PROBLEM — Z86.718 PERSONAL HISTORY OF OTHER VENOUS THROMBOSIS AND EMBOLISM: Chronic | Status: ACTIVE | Noted: 2021-03-07

## 2021-08-23 NOTE — PHYSICAL EXAM
[General Appearance - Well Developed] : well developed [Normal Appearance] : normal appearance [Well Groomed] : well groomed [General Appearance - Well Nourished] : well nourished [No Deformities] : no deformities [General Appearance - In No Acute Distress] : no acute distress [Normal Conjunctiva] : the conjunctiva exhibited no abnormalities [Normal Oral Mucosa] : normal oral mucosa [Normal Oropharynx] : normal oropharynx [Normal Jugular Venous A Waves Present] : normal jugular venous A waves present [Normal Jugular Venous V Waves Present] : normal jugular venous V waves present [No Jugular Venous Gannon A Waves] : no jugular venous gannon A waves [Respiration, Rhythm And Depth] : normal respiratory rhythm and effort [Exaggerated Use Of Accessory Muscles For Inspiration] : no accessory muscle use [Auscultation Breath Sounds / Voice Sounds] : lungs were clear to auscultation bilaterally [Bowel Sounds] : normal bowel sounds [Abdomen Soft] : soft [Abnormal Walk] : normal gait [Nail Clubbing] : no clubbing of the fingernails [Cyanosis, Localized] : no localized cyanosis [Skin Color & Pigmentation] : normal skin color and pigmentation [Skin Turgor] : normal skin turgor [] : no rash [Oriented To Time, Place, And Person] : oriented to person, place, and time [Affect] : the affect was normal [Mood] : the mood was normal [No Anxiety] : not feeling anxious [5th Left ICS - MCL] : palpated at the 5th LICS in the midclavicular line [Normal] : normal [No Precordial Heave] : no precordial heave was noted [Normal Rate] : normal [Rhythm Regular] : regular [Normal S1] : normal S1 [Normal S2] : normal S2 [No Gallop] : no gallop heard [II] : a grade 2 [2+] : left 2+ [No Abnormalities] : the abdominal aorta was not enlarged and no bruit was heard [No Pitting Edema] : no pitting edema present [S3] : no S3 [S4] : no S4 [Click] : no click [Distant] : the heart sounds were ~L not distant [Pericardial Rub] : no pericardial rub

## 2021-08-23 NOTE — DISCUSSION/SUMMARY
[FreeTextEntry1] : Dr. Wynn [facility] (PRIOR VISIT): \par This is a 50-year-old male with past medical history significant for hyperglycemia/prediabetes, status post COVID-19 infection complicated by pneumonia, acute kidney injury, status post stroke, on Coumadin who comes in for follow-up cardiac evaluation.  He denies chest pain, shortness of breath, dizziness or syncope.\par His cardiac risk factors include hypertension, hypercholesterolemia, prediabetes/diabetes and occasional cigar smoking.\par He had an abnormal nuclear stress test September 24, 2020 which demonstrated a small mild pharmacologic stress-induced area of ischemia involving the inferior apical wall.  His estimated ejection fraction is 59%.\par The patient had a coronary artery calcium score September 16, 2020 which was 0.\par The patient had blood work done September 17, 2020 which demonstrated potassium of 4.5, hemoglobin A1c of 6.8, cholesterol 226, HDL of 64, direct LDL cholesterol 142 mg/dL, and triglycerides 103 mg/dL.  His CRP was elevated 6.0.\par The patient will undergo cardiac catheterization this week to define his coronary artery anatomy.\par The patient is currently on Lipitor 40 mg daily and Zetia 10 mg/day.  He will require new blood work done.  His target LDL cholesterol goal is less than 70 mg/dL given his diabetic risk, and overall cardiovascular risk.\par Patient had echocardiogram in 7/2018 which showed LV EF 55-65%, mild MR, mild TR, redundant anterior mitral leaflet and trace IA. His exercise stress test in 8/2018 which was non-ischemic. \par \par His cardiac risk factors include hypertension, hyperlipidemia, positive family history of heart disease, and a social cigar smoker.\par Smoking cessation was reinforced.\par The patient had a normal exercise stress test in 2018.\par He is instructed to follow-up with his primary care physician and neurologist.  He will follow-up with me after the above-noted diagnostic tests are completed.

## 2021-08-23 NOTE — DISCUSSION/SUMMARY
[FreeTextEntry1] : Dr. Wynn [facility] (PRIOR VISIT): \par This is a 50-year-old male with past medical history significant for hyperglycemia/prediabetes, status post COVID-19 infection complicated by pneumonia, acute kidney injury, status post stroke, on Coumadin who comes in for follow-up cardiac evaluation.  He denies chest pain, shortness of breath, dizziness or syncope.\par His cardiac risk factors include hypertension, hypercholesterolemia, prediabetes/diabetes and occasional cigar smoking.\par He had an abnormal nuclear stress test September 24, 2020 which demonstrated a small mild pharmacologic stress-induced area of ischemia involving the inferior apical wall.  His estimated ejection fraction is 59%.\par The patient had a coronary artery calcium score September 16, 2020 which was 0.\par The patient had blood work done September 17, 2020 which demonstrated potassium of 4.5, hemoglobin A1c of 6.8, cholesterol 226, HDL of 64, direct LDL cholesterol 142 mg/dL, and triglycerides 103 mg/dL.  His CRP was elevated 6.0.\par The patient will undergo cardiac catheterization this week to define his coronary artery anatomy.\par The patient is currently on Lipitor 40 mg daily and Zetia 10 mg/day.  He will require new blood work done.  His target LDL cholesterol goal is less than 70 mg/dL given his diabetic risk, and overall cardiovascular risk.\par Patient had echocardiogram in 7/2018 which showed LV EF 55-65%, mild MR, mild TR, redundant anterior mitral leaflet and trace AR. His exercise stress test in 8/2018 which was non-ischemic. \par \par His cardiac risk factors include hypertension, hyperlipidemia, positive family history of heart disease, and a social cigar smoker.\par Smoking cessation was reinforced.\par The patient had a normal exercise stress test in 2018.\par He is instructed to follow-up with his primary care physician and neurologist.  He will follow-up with me after the above-noted diagnostic tests are completed.

## 2021-08-23 NOTE — REASON FOR VISIT
[Follow-Up - Clinic] : a clinic follow-up of [Coronary Artery Disease] : coronary artery disease [Hyperlipidemia] : hyperlipidemia [Hypertension] : hypertension [FreeTextEntry1] : This is a 50 year year old male here today for follow up cardiac followup evaluation. \par He has a past medical history significant for hyperglycemia/prediabetes, status post COVID-19 infection complicated by pneumonia, acute kidney injury, status post stroke, on Coumadin.\par \par Today he is feeling generally well and does not have any complaints at this time. \par He denies fever, chills, weight loss, malaise, rash, alteration bowel habits, weakness, abdominal  pain, bloating, changes in urination, visual disturbances, chest pain, headaches, dizziness, heart palpitations, recent episodes of syncope or falls at this time.\par \par

## 2021-08-23 NOTE — ASSESSMENT
[FreeTextEntry1] : This is a 50-year-old male with past medical history significant for hyperglycemia/prediabetes, status post COVID-19 infection complicated by pneumonia, acute kidney injury, status post stroke, on Coumadin who comes in for follow-up cardiac evaluation.\par -He is a retired .\par \par -The patient had a coronary artery calcium score September 16, 2020 which was 0.\par -The patient had blood work done September 17, 2020 which demonstrated potassium of 4.5, hemoglobin A1c of 6.8, cholesterol 226, HDL of 64, direct LDL cholesterol 142 mg/dL, and triglycerides 103 mg/dL.  His CRP was elevated 6.0.\par -Cardiac Catheterization done on 10/23/2020 demonstrated minimal CAD.\par -New blood work was done 10/19/2020 which demonstrated Cholesterol of 156, HDL 57 and LDL 74. He is on Atorvastatin 40mg PO DAILY and Zetia 10mg  PO Daily. \par \par -Cardiac risk factors include HTN, HLD\par -BP is well controlled in today's visit and patient is on Amlodipine 5mg PO DAILY. The patient stated that he used to be on Valsartan HCTZ however due to COVID the hospital d/c'ed due to it being an ARB.\par -EKG done to which demonstrated regular sinus rhythm with nonspecific ST-T wave changes, BPM of 72.\par -He is following up with his neurologist Dr. 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 SILVINA ARMSTRONG  and he  will follow up in 3 months. He is compliant with all of his  medications.\par \par The patient understands that aerobic exercises must be increased to minutes 4 times/week and a detailed discussion of lifestyle modification was done today. \par The patient has a good understanding of the diagnosis, treatment plan and lifestyle modification. \par He will contact me at the office for any questions with their care or any changes in their health status.\par \par The plan of care was discussed with supervising physician, Dr. Wynn while present in the office at the time of the visit. \par \par Kari MANZANARES .

## 2021-08-23 NOTE — PHYSICAL EXAM
[General Appearance - Well Developed] : well developed [Normal Appearance] : normal appearance [Well Groomed] : well groomed [General Appearance - Well Nourished] : well nourished [No Deformities] : no deformities [General Appearance - In No Acute Distress] : no acute distress [Normal Conjunctiva] : the conjunctiva exhibited no abnormalities [Normal Oral Mucosa] : normal oral mucosa [Normal Oropharynx] : normal oropharynx [Normal Jugular Venous A Waves Present] : normal jugular venous A waves present [Normal Jugular Venous V Waves Present] : normal jugular venous V waves present [No Jugular Venous Gannon A Waves] : no jugular venous gannon A waves [Respiration, Rhythm And Depth] : normal respiratory rhythm and effort [Exaggerated Use Of Accessory Muscles For Inspiration] : no accessory muscle use [Auscultation Breath Sounds / Voice Sounds] : lungs were clear to auscultation bilaterally [Bowel Sounds] : normal bowel sounds [Abdomen Soft] : soft [Abnormal Walk] : normal gait [Nail Clubbing] : no clubbing of the fingernails [Cyanosis, Localized] : no localized cyanosis [Skin Color & Pigmentation] : normal skin color and pigmentation [Skin Turgor] : normal skin turgor [] : no rash [Oriented To Time, Place, And Person] : oriented to person, place, and time [Affect] : the affect was normal [Mood] : the mood was normal [No Anxiety] : not feeling anxious [5th Left ICS - MCL] : palpated at the 5th LICS in the midclavicular line [Normal] : normal [No Precordial Heave] : no precordial heave was noted [Normal Rate] : normal [Rhythm Regular] : regular [Normal S1] : normal S1 [Normal S2] : normal S2 [No Gallop] : no gallop heard [II] : a grade 2 [2+] : left 2+ [No Abnormalities] : the abdominal aorta was not enlarged and no bruit was heard [No Pitting Edema] : no pitting edema present [S3] : no S3 [S4] : no S4 [Click] : no click [Pericardial Rub] : no pericardial rub

## 2021-08-24 ENCOUNTER — LABORATORY RESULT (OUTPATIENT)
Age: 51
End: 2021-08-24

## 2021-08-24 ENCOUNTER — APPOINTMENT (OUTPATIENT)
Dept: CARDIOLOGY | Facility: CLINIC | Age: 51
End: 2021-08-24
Payer: COMMERCIAL

## 2021-08-24 ENCOUNTER — NON-APPOINTMENT (OUTPATIENT)
Age: 51
End: 2021-08-24

## 2021-08-24 VITALS
DIASTOLIC BLOOD PRESSURE: 88 MMHG | SYSTOLIC BLOOD PRESSURE: 136 MMHG | HEIGHT: 71 IN | WEIGHT: 242 LBS | TEMPERATURE: 97.9 F | HEART RATE: 73 BPM | RESPIRATION RATE: 16 BRPM | BODY MASS INDEX: 33.88 KG/M2 | OXYGEN SATURATION: 99 %

## 2021-08-24 DIAGNOSIS — E11.9 TYPE 2 DIABETES MELLITUS W/OUT COMPLICATIONS: ICD-10-CM

## 2021-08-24 PROCEDURE — 99214 OFFICE O/P EST MOD 30 MIN: CPT | Mod: 25

## 2021-08-24 PROCEDURE — 93000 ELECTROCARDIOGRAM COMPLETE: CPT

## 2021-08-24 NOTE — REASON FOR VISIT
[Follow-Up - Clinic] : a clinic follow-up of [CV Risk Factors and Non-Cardiac Disease] : CV risk factors and non-cardiac disease [Hyperlipidemia] : hyperlipidemia [Hypertension] : hypertension [Coronary Artery Disease] : coronary artery disease [FreeTextEntry1] : This is a 50 year year old male here today for follow up cardiac followup evaluation. \par He has a past medical history significant for hyperglycemia/prediabetes, status post COVID-19 infection complicated by pneumonia, acute kidney injury, status post stroke, on Coumadin.\par \par He would like to make a note that since his last appointment he was hospitalized in March 2021. \par SEE NOTE BELOW:\par \par Hospital Course: \par Discharge Date	10-Mar-2021 \par Admission Date	07-Mar-2021 19:34 \par Reason for Admission	Stroke \par Hospital Course	 \par 50 year old  male with a PMHx of COVID-19 pneumonia in April \par 2020 complicated by PATTI, CVA (mild cognitive residual effects), venous sinus \par thrombosis (on Coumadin), DM II (diet controlled), asthma, HTN, HLD presented \par to the ED with LUE and LLE numbness since 14:30. \par \par He reports that he developed his above symptoms about 14:30, he contacted his \par PMD who advised if to present to the ED if his symptoms recur and it recurred \par for about 12 times per patient. He denies any weakness, aphasia, facial \par asymmetry, chest pain, SOB, N/V. \par \par In ED: Vitals stable. Labs grossly unremarkable. INR 3.23 \par \par Hospital course: \par - CT head with no acute intracranial pathology \par - MR brain w and w/o contrast neg for acute events \par - MRA h/n wnl \par - MR cervical spine with C7 nerve impingement and C5-C6 myelomalacia versus \par demyelination; neurosx consulted, no acute intervention. Neurology ok with o/p \par w/u fir further management. No further imaging or LP needed in house. \par - rEEG wnl \par - A1c 7.0 \par - Lipids panel wnl \par - TSH wnl \par - continue Aspirin, statin; symptoms more likely due to c spine findings rather \par than stroke \par - rEEG wnl \par \par Medically stable and cleared for DC home with o/p neuro and PMD f/u. \par \par Today he is feeling generally well and does not have any complaints at this time. \par He denies fever, chills, weight loss, malaise, rash, alteration bowel habits, weakness, abdominal  pain, bloating, changes in urination, visual disturbances, chest pain, headaches, dizziness, heart palpitations, recent episodes of syncope or falls at this time.\par \par

## 2021-08-24 NOTE — DISCUSSION/SUMMARY
[FreeTextEntry1] : Dr. Wynn [facility] (PRIOR VISIT): \par This is a 50-year-old male with past medical history significant for hyperglycemia/prediabetes, status post COVID-19 infection complicated by pneumonia, acute kidney injury, status post stroke, on Coumadin who comes in for follow-up cardiac evaluation.  He denies chest pain, shortness of breath, dizziness or syncope.\par \par His cardiac risk factors include hypertension, hypercholesterolemia, prediabetes/diabetes, positive family history of heart disease and occasional cigar smoking.\par \par He had an abnormal nuclear stress test September 24, 2020 which demonstrated a small mild pharmacologic stress-induced area of ischemia involving the inferior apical wall.  His estimated ejection fraction is 59%.\par \par The patient had a coronary artery calcium score September 16, 2020 which was 0.\par \par The patient had blood work done September 17, 2020 which demonstrated potassium of 4.5, hemoglobin A1c of 6.8, cholesterol 226, HDL of 64, direct LDL cholesterol 142 mg/dL, and triglycerides 103 mg/dL.  His CRP was elevated 6.0.\par \par The patient will undergo cardiac catheterization this week to define his coronary artery anatomy.\par \par The patient is currently on Lipitor 40 mg daily and Zetia 10 mg/day.  He will require new blood work done.  His target LDL cholesterol goal is less than 70 mg/dL given his diabetic risk, and overall cardiovascular risk.\par \par Patient had echocardiogram in 7/2018 which showed LV EF 55-65%, mild MR, mild TR, redundant anterior mitral leaflet and trace CO. His exercise stress test in 8/2018 which was non-ischemic. \par \par The patient had a normal exercise stress test in 2018.\par \par He is instructed to follow-up with his primary care physician and neurologist.  He will follow-up with me after the above-noted diagnostic tests are completed.

## 2021-08-24 NOTE — ASSESSMENT
[FreeTextEntry1] : This is a 51-year-old male with past medical history significant for hyperglycemia/prediabetes, status post COVID-19 infection complicated by pneumonia, acute kidney injury, status post stroke, on Coumadin (managed by his (PCP) who comes in for follow-up cardiac evaluation.\par -He is a retired .\par \par -His cardiac risk factors include hypertension, hypercholesterolemia, prediabetes/diabetes, positive family history of heart disease and occasional cigar smoking. \par \par He would like to make a note that since his last appointment he was hospitalized in March 2021. \par SEE NOTE BELOW:\par \par Hospital Course: \par Discharge Date	10-Mar-2021 \par Admission Date	07-Mar-2021 19:34 \par Reason for Admission	Stroke \par Hospital Course	 \par 50 year old  male with a PMHx of COVID-19 pneumonia in April \par 2020 complicated by PATTI, CVA (mild cognitive residual effects), venous sinus \par thrombosis (on Coumadin), DM II (diet controlled), asthma, HTN, HLD presented \par to the ED with LUE and LLE numbness since 14:30. \par \par He reports that he developed his above symptoms about 14:30, he contacted his \par PMD who advised if to present to the ED if his symptoms recur and it recurred \par for about 12 times per patient. He denies any weakness, aphasia, facial \par asymmetry, chest pain, SOB, N/V. \par \par In ED: Vitals stable. Labs grossly unremarkable. INR 3.23 \par \par Hospital course: \par - CT head with no acute intracranial pathology \par - MR brain w and w/o contrast neg for acute events \par - MRA h/n wnl \par - MR cervical spine with C7 nerve impingement and C5-C6 myelomalacia versus \par demyelination; neurosx consulted, no acute intervention. Neurology ok with o/p \par w/u fir further management. No further imaging or LP needed in house. \par - rEEG wnl \par - A1c 7.0 \par - Lipids panel wnl \par - TSH wnl \par - continue Aspirin, statin; symptoms more likely due to c spine findings rather \par than stroke \par - rEEG wnl \par \par Medically stable and cleared for DC home with o/p neuro and PMD f/u. \par \par -His cardiac risk factors include hypertension, hypercholesterolemia, prediabetes/diabetes, positive family history of heart disease and occasional cigar smoking.\par \par BLOOD PRESSURE:\par -BP is well controlled in today's visit and patient is on Amlodipine 5mg PO DAILY. He would like to make a note that he is also on Lisinopril 10mg PO DAILY as directed by his nephrologist.\par \par BLOOD WORK:\par -New blood work was done today 8/27/2021.\par -He is on Atorvastatin 40mg PO DAILY and Zetia 10mg  PO Daily. \par \par TESTING:\par -EKG done today 8/24/2021 which demonstrated regular sinus rhythm with nonspecific ST-T wave changes BPM of 74.\par \par -He had an abnormal nuclear stress test September 24, 2020 which demonstrated a small mild pharmacologic stress-induced area of ischemia involving the inferior apical wall.  His estimated ejection fraction is 59%.\par \par -Cardiac Catheterization done on 10/23/2020 demonstrated minimal CAD.\par \par -The patient had a normal exercise stress test in 2018.\par \par -The patient had a coronary artery calcium score September 16, 2020 which was 0.\par \par -Echocardiogram done on 9/16/21 demonstrated mild mitral tricuspid and pulmonic regurgitation with normal left ventricular systolic function. EF of 66%.\par \par PLAN:\par -He is following up with his neurologist Dr. 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 SILVINA ARMSTRONG  and he  will follow up in 3 months. He is compliant with all of his  medications.\par \par The patient understands that aerobic exercises must be increased to minutes 4 times/week and a detailed discussion of lifestyle modification was done today. \par The patient has a good understanding of the diagnosis, treatment plan and lifestyle modification. \par He will contact me at the office for any questions with their care or any changes in their health status.\par \par The plan of care was discussed with supervising physician, Dr. Wynn while present in the office at the time of the visit. \par \par Kari MANZANARES .

## 2021-08-24 NOTE — PHYSICAL EXAM
[Well Developed] : well developed [Well Nourished] : well nourished [No Acute Distress] : no acute distress [Normal Venous Pressure] : normal venous pressure [No Carotid Bruit] : no carotid bruit [Normal S1, S2] : normal S1, S2 [No Murmur] : no murmur [No Rub] : no rub [Clear Lung Fields] : clear lung fields [Good Air Entry] : good air entry [No Respiratory Distress] : no respiratory distress  [Soft] : abdomen soft [Non Tender] : non-tender [No Masses/organomegaly] : no masses/organomegaly [Normal Bowel Sounds] : normal bowel sounds [Normal Gait] : normal gait [No Edema] : no edema [No Cyanosis] : no cyanosis [No Clubbing] : no clubbing [No Varicosities] : no varicosities [No Rash] : no rash [No Skin Lesions] : no skin lesions [Moves all extremities] : moves all extremities [No Focal Deficits] : no focal deficits [Normal Speech] : normal speech [Alert and Oriented] : alert and oriented [Normal memory] : normal memory [General Appearance - Well Developed] : well developed [Normal Appearance] : normal appearance [Well Groomed] : well groomed [General Appearance - Well Nourished] : well nourished [No Deformities] : no deformities [General Appearance - In No Acute Distress] : no acute distress [Normal Conjunctiva] : the conjunctiva exhibited no abnormalities [Normal Oral Mucosa] : normal oral mucosa [Normal Oropharynx] : normal oropharynx [Normal Jugular Venous A Waves Present] : normal jugular venous A waves present [Normal Jugular Venous V Waves Present] : normal jugular venous V waves present [No Jugular Venous Gannon A Waves] : no jugular venous gannon A waves [Respiration, Rhythm And Depth] : normal respiratory rhythm and effort [Auscultation Breath Sounds / Voice Sounds] : lungs were clear to auscultation bilaterally [Exaggerated Use Of Accessory Muscles For Inspiration] : no accessory muscle use [Bowel Sounds] : normal bowel sounds [Abdomen Soft] : soft [Abnormal Walk] : normal gait [Nail Clubbing] : no clubbing of the fingernails [Cyanosis, Localized] : no localized cyanosis [Skin Color & Pigmentation] : normal skin color and pigmentation [Skin Turgor] : normal skin turgor [] : no rash [Oriented To Time, Place, And Person] : oriented to person, place, and time [Affect] : the affect was normal [Mood] : the mood was normal [No Anxiety] : not feeling anxious [5th Left ICS - MCL] : palpated at the 5th LICS in the midclavicular line [Normal] : normal [No Precordial Heave] : no precordial heave was noted [Normal Rate] : normal [Rhythm Regular] : regular [Normal S1] : normal S1 [Normal S2] : normal S2 [No Gallop] : no gallop heard [II] : a grade 2 [2+] : left 2+ [No Abnormalities] : the abdominal aorta was not enlarged and no bruit was heard [No Pitting Edema] : no pitting edema present [S3] : no S3 [S4] : no S4 [Click] : no click [Pericardial Rub] : no pericardial rub

## 2021-08-24 NOTE — CARDIOLOGY SUMMARY
[___] : [unfilled] [de-identified] : 8/24/2021 [de-identified] : 9/24/2020 [de-identified] : 9/16/2020 [de-identified] : 10/23/2020

## 2021-11-03 ENCOUNTER — APPOINTMENT (OUTPATIENT)
Dept: CARDIOLOGY | Facility: CLINIC | Age: 51
End: 2021-11-03
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
## 2022-02-02 ENCOUNTER — OUTPATIENT (OUTPATIENT)
Dept: OUTPATIENT SERVICES | Facility: HOSPITAL | Age: 52
LOS: 1 days | Discharge: HOME | End: 2022-02-02
Payer: COMMERCIAL

## 2022-02-02 DIAGNOSIS — R10.9 UNSPECIFIED ABDOMINAL PAIN: ICD-10-CM

## 2022-02-02 PROCEDURE — 76700 US EXAM ABDOM COMPLETE: CPT | Mod: 26

## 2022-03-12 LAB
AT III PPP CHRO-ACNC: 122 %
B2 GLYCOPROT1 AB SER QL: NEGATIVE
B2 GLYCOPROT1 IGA SERPL IA-ACNC: <5 SAU
B2 GLYCOPROT1 IGG SER-ACNC: <5 SGU
B2 GLYCOPROT1 IGM SER-ACNC: <5 SMU
CARDIOLIPIN AB SER IA-ACNC: NEGATIVE
CARDIOLIPIN IGM SER-MCNC: 7.4 MPL
CARDIOLIPIN IGM SER-MCNC: <5 GPL
CONFIRM: 32.9 SEC
DEPRECATED CARDIOLIPIN IGA SER: <5 APL
DNA PLOIDY SPEC FC-IMP: NORMAL
DRVVT IMM 1:2 NP PPP: NORMAL
DRVVT SCREEN TO CONFIRM RATIO: 1.02 RATIO
FIBRINOGEN PPP COAG.DERIVED-MCNC: 640 MG/DL
FOLATE SERPL-MCNC: 8.8 NG/ML
HOMOCYSTEINE LEVEL: 8 UMOL/L
INR PPP: 1.21 RATIO
METHYLMALONIC ACID LEVEL: 117 NMOL/L
PT BLD: 14.3 SEC
PTR INTERP: NORMAL
SCREEN DRVVT: 37.1 SEC
VIT B12 SERPL-MCNC: 485 PG/ML

## 2022-03-25 ENCOUNTER — RX RENEWAL (OUTPATIENT)
Age: 52
End: 2022-03-25

## 2022-03-28 ENCOUNTER — RX RENEWAL (OUTPATIENT)
Age: 52
End: 2022-03-28

## 2022-04-17 ENCOUNTER — EMERGENCY (EMERGENCY)
Facility: HOSPITAL | Age: 52
LOS: 0 days | Discharge: HOME | End: 2022-04-17
Attending: EMERGENCY MEDICINE | Admitting: EMERGENCY MEDICINE
Payer: COMMERCIAL

## 2022-04-17 VITALS
HEART RATE: 97 BPM | TEMPERATURE: 98 F | DIASTOLIC BLOOD PRESSURE: 85 MMHG | WEIGHT: 199.96 LBS | OXYGEN SATURATION: 100 % | RESPIRATION RATE: 18 BRPM | SYSTOLIC BLOOD PRESSURE: 168 MMHG

## 2022-04-17 DIAGNOSIS — I69.319 UNSPECIFIED SYMPTOMS AND SIGNS INVOLVING COGNITIVE FUNCTIONS FOLLOWING CEREBRAL INFARCTION: ICD-10-CM

## 2022-04-17 DIAGNOSIS — R51.9 HEADACHE, UNSPECIFIED: ICD-10-CM

## 2022-04-17 DIAGNOSIS — M25.562 PAIN IN LEFT KNEE: ICD-10-CM

## 2022-04-17 DIAGNOSIS — J45.909 UNSPECIFIED ASTHMA, UNCOMPLICATED: ICD-10-CM

## 2022-04-17 DIAGNOSIS — E11.9 TYPE 2 DIABETES MELLITUS WITHOUT COMPLICATIONS: ICD-10-CM

## 2022-04-17 DIAGNOSIS — R07.89 OTHER CHEST PAIN: ICD-10-CM
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
## 2022-04-17 DIAGNOSIS — Z87.01 PERSONAL HISTORY OF PNEUMONIA (RECURRENT): ICD-10-CM

## 2022-04-17 DIAGNOSIS — Z79.82 LONG TERM (CURRENT) USE OF ASPIRIN: ICD-10-CM

## 2022-04-17 DIAGNOSIS — Z86.16 PERSONAL HISTORY OF COVID-19: ICD-10-CM

## 2022-04-17 DIAGNOSIS — E78.5 HYPERLIPIDEMIA, UNSPECIFIED: ICD-10-CM
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
## 2022-04-17 DIAGNOSIS — I10 ESSENTIAL (PRIMARY) HYPERTENSION: ICD-10-CM
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
## 2022-04-17 DIAGNOSIS — Z79.01 LONG TERM (CURRENT) USE OF ANTICOAGULANTS: ICD-10-CM

## 2022-04-17 DIAGNOSIS — Z86.718 PERSONAL HISTORY OF OTHER VENOUS THROMBOSIS AND EMBOLISM: ICD-10-CM

## 2022-04-17 DIAGNOSIS — S80.212A ABRASION, LEFT KNEE, INITIAL ENCOUNTER: ICD-10-CM

## 2022-04-17 DIAGNOSIS — Z87.448 PERSONAL HISTORY OF OTHER DISEASES OF URINARY SYSTEM: ICD-10-CM

## 2022-04-17 DIAGNOSIS — S20.20XA CONTUSION OF THORAX, UNSPECIFIED, INITIAL ENCOUNTER: ICD-10-CM

## 2022-04-17 DIAGNOSIS — Z20.822 CONTACT WITH AND (SUSPECTED) EXPOSURE TO COVID-19: ICD-10-CM

## 2022-04-17 LAB — SARS-COV-2 RNA SPEC QL NAA+PROBE: SIGNIFICANT CHANGE UP

## 2022-04-17 PROCEDURE — 73590 X-RAY EXAM OF LOWER LEG: CPT | Mod: 26,LT

## 2022-04-17 PROCEDURE — 73562 X-RAY EXAM OF KNEE 3: CPT | Mod: 26,LT

## 2022-04-17 PROCEDURE — 99285 EMERGENCY DEPT VISIT HI MDM: CPT

## 2022-04-17 PROCEDURE — 70450 CT HEAD/BRAIN W/O DYE: CPT | Mod: 26,MA

## 2022-04-17 PROCEDURE — 71045 X-RAY EXAM CHEST 1 VIEW: CPT | Mod: 26

## 2022-04-17 NOTE — ED PROVIDER NOTE - NSFOLLOWUPCLINICS_GEN_ALL_ED_FT
[facility]  Medicine  [address], [place]   Phone: [phone]  Fax:   Follow Up Time: 1-3 Days
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
## 2022-04-17 NOTE — ED PROVIDER NOTE - NSFOLLOWUPINSTRUCTIONS_ED_ALL_ED_FT
Rib Contusion  A rib contusion is a deep bruise on your rib area. Contusions are the result of a blunt trauma that causes bleeding and injury to the tissues under the skin. A rib contusion may involve bruising of the ribs and of the skin and muscles in the area. The skin over the contusion may turn blue, purple, or yellow. Minor injuries will give you a painless contusion. More severe contusions may stay painful and swollen for a few weeks.  What are the causes?  This condition is usually caused by a blow, trauma, or direct force to an area of the body. This often occurs while playing contact sports.  What are the signs or symptoms?  Symptoms of this condition include:  Swelling and redness of the injured area.Discoloration of the injured area.Tenderness and soreness of the injured area.Pain with or without movement.How is this diagnosed?  This condition may be diagnosed based on:  Your symptoms and medical history.A physical exam.Imaging tests—such as an X-ray, CT scan, or MRI—to determine if there were internal injuries or broken bones (fractures).How is this treated?  This condition may be treated with:  Rest. This is often the best treatment for a rib contusion.Icing. This reduces swelling and inflammation.Deep-breathing exercises. These may be recommended to reduce the risk for lung collapse and pneumonia.Medicines. Over-the-counter or prescription medicines may be given to control pain.Injection of a numbing medicine around the nerve near your injury (nerve block).Follow these instructions at home:            Medicines     Take over-the-counter and prescription medicines only as told by your health care provider.Do not drive or use heavy machinery while taking prescription pain medicine.If you are taking prescription pain medicine, take actions to prevent or treat constipation. Your health care provider may recommend that you:   Drink enough fluid to keep your urine pale yellow. Eat foods that are high in fiber, such as fresh fruits and vegetables, whole grains, and beans. Limit foods that are high in fat and processed sugars, such as fried or sweet foods. Take an over-the-counter or prescription medicine for constipation.Managing pain, stiffness, and swelling     If directed, put ice on the injured area:  Put ice in a plastic bag.Place a towel between your skin and the bag.Leave the ice on for 20 minutes, 2–3 times a day.Rest the injured area. Avoid strenuous activity and any activities or movements that cause pain. Be careful during activities and avoid bumping the injured area.Do not lift anything that is heavier than 5 lb (2.3 kg), or the limit that you are told, until your health care provider says that it is safe.General instructions     Do not use any products that contain nicotine or tobacco, such as cigarettes and e-cigarettes. These can delay healing. If you need help quitting, ask your health care provider.Do deep-breathing exercises as told by your health care provider.If you were given an incentive spirometer, use it every 1–2 hours while you are awake, or as recommended by your health care provider. This device measures how well you are filling your lungs with each breath.Keep all follow-up visits as told by your health care provider. This is important.Contact a health care provider if you have:  Increased bruising or swelling.Pain that is not controlled with treatment.A fever.Get help right away if you:  Have difficulty breathing or shortness of breath.Develop a continual cough or you cough up thick or bloody sputum.Feel nauseous or you vomit.Have pain in your abdomen.Summary  A rib contusion is a deep bruise on your rib area. Contusions are the result of a blunt trauma that causes bleeding and injury to the tissues under the skin.The skin overlying the contusion may turn blue, purple, or yellow. Minor injuries may give you a painless contusion. More severe contusions may stay painful and swollen for a few weeks.Rest the injured area. Avoid strenuous activity and any activities or movements that cause pain.This information is not intended to replace advice given to you by your health care provider. Make sure you discuss any questions you have with your health care provider.    Document Released: 09/12/2002 Document Revised: 01/16/2019 Document Reviewed: 01/16/2019  [employer] Interactive Patient Education © 2019 [employer]
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
## 2022-04-17 NOTE — ED PROVIDER NOTE - HIV OFFER
[address] at [facility]  [phone]    03/28/22 8:52 AM - Called and spoke with the patient. Patient's ID verified x 2. Inquired if she was able to see the result note written by Dr. Esau Moya. The patient states she was able to see it. She then states the ENT office we referred her to cannot get her in until May for the consultation. She is going to see Dr. Charity Griffith, but the patient states that is a very long time to wait. Advised this nurse would update Dr. Esau Moya above and she would receive a call back as soon as possible with recommendations. Opt out

## 2022-04-17 NOTE — ED PROVIDER NOTE - PROGRESS NOTE DETAILS
SS Stable, comfortable in ED. Patient to be discharged from ED. Any available test results were discussed with patient and/or family. Verbal instructions given, including instructions to return to ED immediately for any new, worsening, or concerning symptoms. Patient endorsed understanding. Written discharge instructions additionally given, including follow-up plan.

## 2022-04-17 NOTE — ED PROVIDER NOTE - PHYSICAL EXAMINATION
CONSTITUTIONAL: NAD  SKIN: Warm dry  HEAD: NCAT  EYES: NL inspection  ENT: MMM  NECK: Supple; non tender. Midline no spinal tenderness, FROM  CARD: RRR  RESP: CTAB  ABD: S/NT no R/G  EXT: no pedal edema  NEURO: Grossly unremarkable  PSYCH: Cooperative, appropriate.  MSK: +LT lateral rib ttp, no obvious abrasion/laceration/echymossis over area  +RT knee medial echymossis, ttp, FROM of bl knees

## 2022-04-17 NOTE — ED PROVIDER NOTE - PATIENT PORTAL LINK FT
You can access the FollowMyHealth Patient Portal offered by [facility] by registering at the following website: [email or web address]. By joining [facility]’s FollowMyHealth portal, you will also be able to view your health information using other applications (apps) compatible with our system.
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
## 2022-04-18 ENCOUNTER — RX CHANGE (OUTPATIENT)
Age: 52
End: 2022-04-18

## 2022-04-26 ENCOUNTER — RX CHANGE (OUTPATIENT)
Age: 52
End: 2022-04-26

## 2022-04-28 NOTE — ED ADULT NURSE NOTE - FINAL NURSING ELECTRONIC SIGNATURE
-- DO NOT REPLY / DO NOT REPLY ALL --  -- Message is from the [facility]--    Provider paged via [employer] Documentation - The below message was copied and pasted from a [employer] page:    Bernheim, Bruce S, MD   Secure Text   [phone] PATIENT NUMBER -------------------------------- ACC NURSE LINE (IF QUESTIONS ONLY - [phone]) URGENT FROM: SABRINA RN, [facility] REQUESTED DR:BRUCE BERNHEIM DR. BRUCE BERNHEIM PLEASE ADVISE REGARDING ELEVATED BLOOD SUGAR 417 THIS MORNING. CURRENT BLOOD SUGAR . PATIENT STATES SHE IS URINATING MORE OFTEN. SHE GOT A STEROID INJECTION TO RIGHT KNEE YESTERDAY (ALBU*)       17-Apr-2022 23:21

## 2022-05-02 ENCOUNTER — RX CHANGE (OUTPATIENT)
Age: 52
End: 2022-05-02

## 2022-05-21 ENCOUNTER — RX RENEWAL (OUTPATIENT)
Age: 52
End: 2022-05-21

## 2022-06-02 ENCOUNTER — RX RENEWAL (OUTPATIENT)
Age: 52
End: 2022-06-02

## 2022-06-13 ENCOUNTER — LABORATORY RESULT (OUTPATIENT)
Age: 52
End: 2022-06-13

## 2022-06-13 ENCOUNTER — NON-APPOINTMENT (OUTPATIENT)
Age: 52
End: 2022-06-13

## 2022-06-13 ENCOUNTER — APPOINTMENT (OUTPATIENT)
Dept: CARDIOLOGY | Facility: CLINIC | Age: 52
End: 2022-06-13
Payer: COMMERCIAL

## 2022-06-13 VITALS
SYSTOLIC BLOOD PRESSURE: 110 MMHG | DIASTOLIC BLOOD PRESSURE: 78 MMHG | OXYGEN SATURATION: 98 % | RESPIRATION RATE: 16 BRPM | HEIGHT: 71 IN | WEIGHT: 242 LBS | TEMPERATURE: 97.6 F | BODY MASS INDEX: 33.88 KG/M2 | HEART RATE: 78 BPM

## 2022-06-13 DIAGNOSIS — U07.1 COVID-19: ICD-10-CM

## 2022-06-13 PROCEDURE — 99214 OFFICE O/P EST MOD 30 MIN: CPT | Mod: 25

## 2022-06-13 PROCEDURE — 93000 ELECTROCARDIOGRAM COMPLETE: CPT

## 2022-06-13 RX ORDER — NIRMATRELVIR AND RITONAVIR 300-100 MG
20 X 150 MG & KIT ORAL
Qty: 30 | Refills: 0 | Status: DISCONTINUED | COMMUNITY
Start: 2022-05-21

## 2022-06-13 NOTE — PHYSICAL EXAM
[Well Developed] : well developed [Well Nourished] : well nourished [No Acute Distress] : no acute distress [Normal Venous Pressure] : normal venous pressure [No Carotid Bruit] : no carotid bruit [Normal S1, S2] : normal S1, S2 [No Murmur] : no murmur [No Rub] : no rub [Clear Lung Fields] : clear lung fields [Good Air Entry] : good air entry [No Respiratory Distress] : no respiratory distress  [Soft] : abdomen soft [Non Tender] : non-tender [No Masses/organomegaly] : no masses/organomegaly [Normal Bowel Sounds] : normal bowel sounds [Normal Gait] : normal gait [No Edema] : no edema [No Cyanosis] : no cyanosis [No Clubbing] : no clubbing [No Varicosities] : no varicosities [No Rash] : no rash [No Skin Lesions] : no skin lesions [Moves all extremities] : moves all extremities [No Focal Deficits] : no focal deficits [Normal Speech] : normal speech [Alert and Oriented] : alert and oriented [Normal memory] : normal memory [General Appearance - Well Developed] : well developed [Normal Appearance] : normal appearance [Well Groomed] : well groomed [General Appearance - Well Nourished] : well nourished [No Deformities] : no deformities [General Appearance - In No Acute Distress] : no acute distress [Normal Conjunctiva] : the conjunctiva exhibited no abnormalities [Normal Oral Mucosa] : normal oral mucosa [Normal Oropharynx] : normal oropharynx [Normal Jugular Venous A Waves Present] : normal jugular venous A waves present [Normal Jugular Venous V Waves Present] : normal jugular venous V waves present [No Jugular Venous Gannon A Waves] : no jugular venous gannon A waves [Respiration, Rhythm And Depth] : normal respiratory rhythm and effort [Exaggerated Use Of Accessory Muscles For Inspiration] : no accessory muscle use [Auscultation Breath Sounds / Voice Sounds] : lungs were clear to auscultation bilaterally [Bowel Sounds] : normal bowel sounds [Abdomen Soft] : soft [Abnormal Walk] : normal gait [Nail Clubbing] : no clubbing of the fingernails [Cyanosis, Localized] : no localized cyanosis [Skin Color & Pigmentation] : normal skin color and pigmentation [Skin Turgor] : normal skin turgor [] : no rash [Oriented To Time, Place, And Person] : oriented to person, place, and time [Affect] : the affect was normal [Mood] : the mood was normal [No Anxiety] : not feeling anxious [5th Left ICS - MCL] : palpated at the 5th LICS in the midclavicular line [Normal] : normal [No Precordial Heave] : no precordial heave was noted [Normal Rate] : normal [Rhythm Regular] : regular [Normal S1] : normal S1 [Normal S2] : normal S2 [No Gallop] : no gallop heard [II] : a grade 2 [2+] : left 2+ [No Abnormalities] : the abdominal aorta was not enlarged and no bruit was heard [No Pitting Edema] : no pitting edema present [S3] : no S3 [S4] : no S4 [Click] : no click [Pericardial Rub] : no pericardial rub

## 2022-06-13 NOTE — CARDIOLOGY SUMMARY
[___] : [unfilled] [de-identified] : 6/13/22 [de-identified] : 9/24/2020 [de-identified] : 9/16/2020 [de-identified] : 10/23/2020

## 2022-06-13 NOTE — ASSESSMENT
[FreeTextEntry1] : This is a 52 year year old male here today for follow up cardiac followup evaluation. \par He has a past medical history significant for hyperglycemia/prediabetes, status post COVID-19 infection complicated by pneumonia, acute kidney injury, status post stroke, on Coumadin.\par \par CHIEF COMPLAINT:\par Today he is feeling generally well and does not have any complaints at this time. \par Mr. ARMSTRONG is currently prescribed Amlodipine 5mg PO DAILY, Atorvastatin 40mg PO DAILY, Zetia 10mg  PO Daily, Lisinopril 10mg PO DAILY and on Warfarin for management of stroke.\par \par -He is a retired .\par \par -His cardiac risk factors include hypertension, hypercholesterolemia, prediabetes/diabetes, positive family history of heart disease and occasional cigar smoking. \par \par BLOOD PRESSURE:\par -BP is well controlled in today's visit.\par \par -I have discussed the importance of maintaining good BP control and reviewed the newest guidelines with the patient while re-enforcing dietary sodium restrictions to no more than 2-3 g daily, DASH diet, life style modifications as well as the goal of maintaining ideal body weight with the patient today. I have advised the patient to avoid the use of over-the-counter medications/ supplements especially NSAIDS.\par \par I have reviewed with Mr. ARMSTRONG that serious health consequences can occur when blood pressure is not well controlled and the need for strict compliance with medication and that optimal control can significantly reduce the risk of cardiovascular disease stroke, heart attack and other organ damage. They verbally expressed understanding of the fore mentioned serious health consequences to me today.\par \par BLOOD WORK:\par -New blood work was done today, 06/13/2022 to evaluate lipid profile, CBC, BMP, hepatic function, A1C and TSH\par \par CHOLESTEROL CONTROL:\par -Patient will continue the advised  TLC diet and to continue follow-up for treatment of hyperlipidemia and repeat blood testing with diet and exercise. I have discussed different exercises and the importance of maintenance of optimal body weight. The importance of staying within guidelines and recommendations was stressed to the patient today and they acknowledged that they understand this to me verbally.\par \par  -Mr. ARMSTRONG was educated and advised that failure to follow-up with my medical recommendations to lower cholesterol can result in severe health consequences therefore, they will continuing a low saturated and low fat diet and to avoid excessive carbohydrates to help reduce triglycerides and that lowering LDL levels is associated with a significant decrease in serious cardiac events including but not limited to heart attack stroke and overall death. We will continue lipid lowering agents as advised based on blood test results and the patient understands to call if they develop severe muscle discomfort or if they have a reddish tinted discoloration in their urine.\par \par TESTING/REPORTS:\par -EKG done Jun 13, 2022 which demonstrated regular sinus rhythm with nonspecific ST-T wave changes, BPM of \par TESTING:\par \par -EKG done today 8/24/2021 which demonstrated regular sinus rhythm with nonspecific ST-T wave changes BPM of 74.\par \par -He had an abnormal nuclear stress test September 24, 2020 which demonstrated a small mild pharmacologic stress-induced area of ischemia involving the inferior apical wall.  His estimated ejection fraction is 59%.\par \par -Cardiac Catheterization done on 10/23/2020 demonstrated minimal CAD.\par \par -The patient had a normal exercise stress test in 2018.\par \par -The patient had a coronary artery calcium score September 16, 2020 which was 0.\par \par -Echocardiogram done on 9/16/21 demonstrated mild mitral tricuspid and pulmonic regurgitation with normal left ventricular systolic function. EF of 66%.\par \par He was hospitalized in March 2021. \par SEE NOTE BELOW:\par \par Hospital Course: \par Discharge Date	10-Mar-2021 \par Admission Date	07-Mar-2021 19:34 \par Reason for Admission	Stroke \par Hospital Course	 \par 50 year old  male with a PMHx of COVID-19 pneumonia in April \par 2020 complicated by PATTI, CVA (mild cognitive residual effects), venous sinus \par thrombosis (on Coumadin), DM II (diet controlled), asthma, HTN, HLD presented \par to the ED with LUE and LLE numbness since 14:30. \par \par He reports that he developed his above symptoms about 14:30, he contacted his \par PMD who advised if to present to the ED if his symptoms recur and it recurred \par for about 12 times per patient. He denies any weakness, aphasia, facial \par asymmetry, chest pain, SOB, N/V. \par \par In ED: Vitals stable. Labs grossly unremarkable. INR 3.23 \par \par Hospital course: \par - CT head with no acute intracranial pathology \par - MR brain w and w/o contrast neg for acute events \par - MRA h/n wnl \par - MR cervical spine with C7 nerve impingement and C5-C6 myelomalacia versus \par demyelination; neurosx consulted, no acute intervention. Neurology ok with o/p \par w/u fir further management. No further imaging or LP needed in house. \par - rEEG wnl \par - A1c 7.0 \par - Lipids panel wnl \par - TSH wnl \par - continue Aspirin, statin; symptoms more likely due to c spine findings rather \par than stroke \par - rEEG wnl \par \par Medically stable and cleared for DC home with o/p neuro and PMD f/u. \par \par PLAN:\par -He is following up with his neurologist Dr. Delarosa...?  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 \par \par I have discussed the plan of care with Mr. SILVINA ARMSTRONG  and he  will follow up in 3 months. He is compliant with all of his medications.\par \par The patient understands that aerobic exercises must be increased to minutes 4 times/week and a detailed discussion of lifestyle modification was done today. \par The patient has a good understanding of the diagnosis, treatment plan and lifestyle modification. \par He will contact me at the office for any questions with their care or any changes in their health status.\par \par \par Kari NP \par

## 2022-06-13 NOTE — DISCUSSION/SUMMARY
[FreeTextEntry1] : Dr. W[facility] (PRIOR VISIT): \par This is a 50-year-old male with past medical history significant for hyperglycemia/prediabetes, status post COVID-19 infection complicated by pneumonia, acute kidney injury, status post stroke, on Coumadin who comes in for follow-up cardiac evaluation.  He denies chest pain, shortness of breath, dizziness or syncope.\par \par His cardiac risk factors include hypertension, hypercholesterolemia, prediabetes/diabetes, positive family history of heart disease and occasional cigar smoking.\par \par He had an abnormal nuclear stress test September 24, 2020 which demonstrated a small mild pharmacologic stress-induced area of ischemia involving the inferior apical wall.  His estimated ejection fraction is 59%.\par \par The patient had a coronary artery calcium score September 16, 2020 which was 0.\par \par The patient had blood work done September 17, 2020 which demonstrated potassium of 4.5, hemoglobin A1c of 6.8, cholesterol 226, HDL of 64, direct LDL cholesterol 142 mg/dL, and triglycerides 103 mg/dL.  His CRP was elevated 6.0.\par \par The patient will undergo cardiac catheterization this week to define his coronary artery anatomy.\par \par The patient is currently on Lipitor 40 mg daily and Zetia 10 mg/day.  He will require new blood work done.  His target LDL cholesterol goal is less than 70 mg/dL given his diabetic risk, and overall cardiovascular risk.\par \par Patient had echocardiogram in 7/2018 which showed LV EF 55-65%, mild MR, mild TR, redundant anterior mitral leaflet and trace ID. His exercise stress test in 8/2018 which was non-ischemic. \par \par The patient had a normal exercise stress test in 2018.\par \par He is instructed to follow-up with his primary care physician and neurologist.  He will follow-up with me after the above-noted diagnostic tests are completed.

## 2022-06-13 NOTE — REASON FOR VISIT
[CV Risk Factors and Non-Cardiac Disease] : CV risk factors and non-cardiac disease [Follow-Up - Clinic] : a clinic follow-up of [Coronary Artery Disease] : coronary artery disease [Hyperlipidemia] : hyperlipidemia [Hypertension] : hypertension [FreeTextEntry1] : This is a 52 year year old male here today for follow up cardiac followup evaluation. \par He has a past medical history significant for hyperglycemia/prediabetes, status post COVID-19 infection complicated by pneumonia, acute kidney injury, status post stroke, on Coumadin.\par \par CHIEF COMPLAINT:\par Today he is feeling generally well and does not have any complaints at this time. \par Mr. ARMSTRONG is currently prescribed Amlodipine 5mg PO DAILY, Atorvastatin 40mg PO DAILY, Zetia 10mg  PO Daily, Lisinopril 10mg PO DAILY and on Warfarin for management of stroke.\par \par He denies fever, chills, weight loss, malaise, rash, alteration bowel habits, weakness, abdominal  pain, bloating, changes in urination, visual disturbances, chest pain, headaches, dizziness, heart palpitations, recent episodes of syncope or falls at this time.\par \par  \par

## 2022-06-15 ENCOUNTER — TRANSCRIPTION ENCOUNTER (OUTPATIENT)
Age: 52
End: 2022-06-15

## 2022-11-15 ENCOUNTER — EMERGENCY (EMERGENCY)
Facility: HOSPITAL | Age: 52
LOS: 0 days | Discharge: HOME | End: 2022-11-15
Attending: EMERGENCY MEDICINE | Admitting: EMERGENCY MEDICINE

## 2022-11-15 VITALS
HEART RATE: 92 BPM | RESPIRATION RATE: 18 BRPM | TEMPERATURE: 98 F | DIASTOLIC BLOOD PRESSURE: 101 MMHG | SYSTOLIC BLOOD PRESSURE: 134 MMHG | OXYGEN SATURATION: 99 %

## 2022-11-15 DIAGNOSIS — R06.02 SHORTNESS OF BREATH: ICD-10-CM

## 2022-11-15 DIAGNOSIS — R05.1 ACUTE COUGH: ICD-10-CM

## 2022-11-15 DIAGNOSIS — N18.2 CHRONIC KIDNEY DISEASE, STAGE 2 (MILD): ICD-10-CM

## 2022-11-15 DIAGNOSIS — J45.909 UNSPECIFIED ASTHMA, UNCOMPLICATED: ICD-10-CM

## 2022-11-15 DIAGNOSIS — Z86.73 PERSONAL HISTORY OF TRANSIENT ISCHEMIC ATTACK (TIA), AND CEREBRAL INFARCTION WITHOUT RESIDUAL DEFICITS: ICD-10-CM

## 2022-11-15 DIAGNOSIS — Z79.4 LONG TERM (CURRENT) USE OF INSULIN: ICD-10-CM

## 2022-11-15 DIAGNOSIS — R07.89 OTHER CHEST PAIN: ICD-10-CM

## 2022-11-15 DIAGNOSIS — Z86.16 PERSONAL HISTORY OF COVID-19: ICD-10-CM

## 2022-11-15 DIAGNOSIS — Z79.01 LONG TERM (CURRENT) USE OF ANTICOAGULANTS: ICD-10-CM

## 2022-11-15 DIAGNOSIS — B34.9 VIRAL INFECTION, UNSPECIFIED: ICD-10-CM

## 2022-11-15 DIAGNOSIS — E78.5 HYPERLIPIDEMIA, UNSPECIFIED: ICD-10-CM

## 2022-11-15 DIAGNOSIS — Z79.82 LONG TERM (CURRENT) USE OF ASPIRIN: ICD-10-CM

## 2022-11-15 DIAGNOSIS — E11.22 TYPE 2 DIABETES MELLITUS WITH DIABETIC CHRONIC KIDNEY DISEASE: ICD-10-CM

## 2022-11-15 DIAGNOSIS — I12.9 HYPERTENSIVE CHRONIC KIDNEY DISEASE WITH STAGE 1 THROUGH STAGE 4 CHRONIC KIDNEY DISEASE, OR UNSPECIFIED CHRONIC KIDNEY DISEASE: ICD-10-CM

## 2022-11-15 LAB
ALBUMIN SERPL ELPH-MCNC: 4.1 G/DL — SIGNIFICANT CHANGE UP (ref 3.5–5.2)
ALP SERPL-CCNC: 69 U/L — SIGNIFICANT CHANGE UP (ref 30–115)
ALT FLD-CCNC: 31 U/L — SIGNIFICANT CHANGE UP (ref 0–41)
ANION GAP SERPL CALC-SCNC: 10 MMOL/L — SIGNIFICANT CHANGE UP (ref 7–14)
APTT BLD: 54.3 SEC — HIGH (ref 27–39.2)
AST SERPL-CCNC: 44 U/L — HIGH (ref 0–41)
BASOPHILS # BLD AUTO: 0.02 K/UL — SIGNIFICANT CHANGE UP (ref 0–0.2)
BASOPHILS NFR BLD AUTO: 0.2 % — SIGNIFICANT CHANGE UP (ref 0–1)
BILIRUB SERPL-MCNC: 0.4 MG/DL — SIGNIFICANT CHANGE UP (ref 0.2–1.2)
BUN SERPL-MCNC: 9 MG/DL — LOW (ref 10–20)
CALCIUM SERPL-MCNC: 8.9 MG/DL — SIGNIFICANT CHANGE UP (ref 8.4–10.5)
CHLORIDE SERPL-SCNC: 102 MMOL/L — SIGNIFICANT CHANGE UP (ref 98–110)
CO2 SERPL-SCNC: 23 MMOL/L — SIGNIFICANT CHANGE UP (ref 17–32)
CREAT SERPL-MCNC: 0.9 MG/DL — SIGNIFICANT CHANGE UP (ref 0.7–1.5)
EGFR: 103 ML/MIN/1.73M2 — SIGNIFICANT CHANGE UP
EOSINOPHIL # BLD AUTO: 0.13 K/UL — SIGNIFICANT CHANGE UP (ref 0–0.7)
EOSINOPHIL NFR BLD AUTO: 1.3 % — SIGNIFICANT CHANGE UP (ref 0–8)
GLUCOSE SERPL-MCNC: 103 MG/DL — HIGH (ref 70–99)
HCT VFR BLD CALC: 41.9 % — LOW (ref 42–52)
HGB BLD-MCNC: 14.3 G/DL — SIGNIFICANT CHANGE UP (ref 14–18)
IMM GRANULOCYTES NFR BLD AUTO: 0.4 % — HIGH (ref 0.1–0.3)
INR BLD: 3.99 RATIO — HIGH (ref 0.65–1.3)
LYMPHOCYTES # BLD AUTO: 1.71 K/UL — SIGNIFICANT CHANGE UP (ref 1.2–3.4)
LYMPHOCYTES # BLD AUTO: 17.4 % — LOW (ref 20.5–51.1)
MAGNESIUM SERPL-MCNC: 1.8 MG/DL — SIGNIFICANT CHANGE UP (ref 1.8–2.4)
MCHC RBC-ENTMCNC: 29.7 PG — SIGNIFICANT CHANGE UP (ref 27–31)
MCHC RBC-ENTMCNC: 34.1 G/DL — SIGNIFICANT CHANGE UP (ref 32–37)
MCV RBC AUTO: 87.1 FL — SIGNIFICANT CHANGE UP (ref 80–94)
MONOCYTES # BLD AUTO: 0.81 K/UL — HIGH (ref 0.1–0.6)
MONOCYTES NFR BLD AUTO: 8.2 % — SIGNIFICANT CHANGE UP (ref 1.7–9.3)
NEUTROPHILS # BLD AUTO: 7.14 K/UL — HIGH (ref 1.4–6.5)
NEUTROPHILS NFR BLD AUTO: 72.5 % — SIGNIFICANT CHANGE UP (ref 42.2–75.2)
NRBC # BLD: 0 /100 WBCS — SIGNIFICANT CHANGE UP (ref 0–0)
PLATELET # BLD AUTO: 235 K/UL — SIGNIFICANT CHANGE UP (ref 130–400)
POTASSIUM SERPL-MCNC: SIGNIFICANT CHANGE UP MMOL/L (ref 3.5–5)
POTASSIUM SERPL-SCNC: SIGNIFICANT CHANGE UP MMOL/L (ref 3.5–5)
PROT SERPL-MCNC: 7.9 G/DL — SIGNIFICANT CHANGE UP (ref 6–8)
PROTHROM AB SERPL-ACNC: >40 SEC — HIGH (ref 9.95–12.87)
RBC # BLD: 4.81 M/UL — SIGNIFICANT CHANGE UP (ref 4.7–6.1)
RBC # FLD: 13.4 % — SIGNIFICANT CHANGE UP (ref 11.5–14.5)
SARS-COV-2 RNA SPEC QL NAA+PROBE: SIGNIFICANT CHANGE UP
SODIUM SERPL-SCNC: 135 MMOL/L — SIGNIFICANT CHANGE UP (ref 135–146)
TROPONIN T SERPL-MCNC: <0.01 NG/ML — SIGNIFICANT CHANGE UP
WBC # BLD: 9.85 K/UL — SIGNIFICANT CHANGE UP (ref 4.8–10.8)
WBC # FLD AUTO: 9.85 K/UL — SIGNIFICANT CHANGE UP (ref 4.8–10.8)

## 2022-11-15 PROCEDURE — 93010 ELECTROCARDIOGRAM REPORT: CPT

## 2022-11-15 PROCEDURE — 71045 X-RAY EXAM CHEST 1 VIEW: CPT | Mod: 26

## 2022-11-15 PROCEDURE — 93308 TTE F-UP OR LMTD: CPT | Mod: 26

## 2022-11-15 PROCEDURE — 99285 EMERGENCY DEPT VISIT HI MDM: CPT | Mod: 25

## 2022-11-15 NOTE — ED PROVIDER NOTE - NS ED ROS FT
As follows:   CONST: No fever, chills or bodyaches  EYES: No pain, redness, drainage or visual changes.  ENT: + Sore throat. No ear pain or discharge, nasal discharge or congestion.   CARD: + Right sided chest pain. No palpitations  RESP: + Mild SoB and cough. No hemoptysis.   GI: No abdominal pain, N/V/D  SKIN: No rashes  NEURO: No headacheC

## 2022-11-15 NOTE — ED PROVIDER NOTE - OBJECTIVE STATEMENT
Pt is a 51 y/o male with PMHx of HTN, DM, stage 2 kidney disease, and prior stroke currently on Warfarin presenting for non radiating right sided chest pain. Pt has had cough, shortness of breath, and sore throat for 1 week managed at home with Tylenol. At home covid tests were negative. Today, pt awoke and has right sided chest pain prompting him to come to the ED. He denies any fever, chills, N/V/D/C, abdominal pain, or urinary complaints.

## 2022-11-15 NOTE — ED PROVIDER NOTE - NSFOLLOWUPINSTRUCTIONS_ED_ALL_ED_FT
Follow up with PMD and Cardiology in 1-2 days.    Chest Pain    Chest pain can be caused by many different conditions which may or may not be dangerous. Causes include heartburn, lung infections, heart attack, blood clot in lungs, skin infections, strain or damage to muscle, cartilage, or bones, etc. In addition to a history and physical examination, an electrocardiogram (ECG) or other lab tests may have been performed to determine the cause of your chest pain. Follow up with your primary care provider or with a cardiologist as instructed.     SEEK IMMEDIATE MEDICAL CARE IF YOU HAVE ANY OF THE FOLLOWING SYMPTOMS: worsening chest pain, coughing up blood, unexplained back/neck/jaw pain, severe abdominal pain, dizziness or lightheadedness, fainting, shortness of breath, sweaty or clammy skin, vomiting, or racing heart beat. These symptoms may represent a serious problem that is an emergency. Do not wait to see if the symptoms will go away. Get medical help right away. Call 911 and do not drive yourself to the hospital.

## 2022-11-15 NOTE — ED PROVIDER NOTE - CLINICAL SUMMARY MEDICAL DECISION MAKING FREE TEXT BOX
Pleuritic chest pain with cough only.  Viral illness.  Labs and imaging reviewed.  Bedside echo without effusion and a normal function.   DC with follow-up.

## 2022-11-15 NOTE — ED PROVIDER NOTE - PATIENT PORTAL LINK FT
You can access the FollowMyHealth Patient Portal offered by [facility] by registering at the following website: [email or web address]. By joining [employer]’s FollowMyHealth portal, you will also be able to view your health information using other applications (apps) compatible with our system.

## 2022-11-15 NOTE — ED PROVIDER NOTE - CARE PROVIDER_API CALL
[place])  Cardiovascular Disease; Internal Medicine  [address] [place] [address]  Phone: [phone]  Fax: [phone]  Follow Up Time:

## 2022-11-15 NOTE — ED PROVIDER NOTE - ATTENDING APP SHARED VISIT CONTRIBUTION OF CARE
52-year-old male history hypertension, diabetes, CKD, stroke on warfarin, now presents with 1 week history of cough congestion sore throat.  Over the last couple days has been having chest pain with cough only yellow sputum.  No fevers no vomiting no diaphoresis.  No radiation.    vss, nontoxic, well appearing, pink conj, anicteric, MMM, neck supple, CTAB, RRR, equal radial pulses bilat, abd soft/nt/nd, no cva tend. no calves tend, no edema, no fnd. no rashes.    Plan:  labs, imaging, meds, reassess

## 2022-11-15 NOTE — ED PROVIDER NOTE - PHYSICAL EXAMINATION
As Follows:  CONST: Well appearing in NAD. Sitting upright in alcocer stretcher  EYES: EOMI, Sclera and conjunctiva clear.   ENT: Erythematous oropharynx. No nasal discharge. Uvula midline.  CARD: Normal S1 S2; Normal rate and rhythm  RESP: Equal BS B/L, No wheezes, rhonchi or rales. No distress  MS: Non tender chest wall.   SKIN: Warm, dry, no acute rashes or vesicles.

## 2022-11-15 NOTE — ED ADULT NURSE NOTE - SUICIDE SCREENING DEPRESSION
DEVICE CLINIC RN POST-OP NOTE    Reason for visit: 1 week post op ICD and wound check     Device: [employer] D140 INOGEN  Procedure date: 7/6/2018  Implant Diagnosis: Ischemic Cardiomypathy  Implanting Physician: Dr. Hany Fernández      Assessment  Subjective: Daniele denies any pain or discomfort.     Please see Eliazar Do RN CNP dictation for assessment and vital signs.     Incision/device pocket: The steri-strips were removed from the incision and it was cleansed with adhesive remover and alcohol wipes. The incision is clean, dry and intact. There are no signs of infection present. The incision is well healed.  There is a resolving hematoma what is soft and the ecchymosis is yellowing. Dr. Fernández did take a look at his incision and hematoma also, no concerns at this point.       Device Findings  Interrogation of device reveals normal sensing and capture thresholds  See the [employer] Report for a full summary of the device information.          Patient Education  Daniele Becker was accompanied today by his wife, Riya     [facility]'s Partnership Agreement for Device Patients and Post-operative Checklist were presented and reviewed with patient at today's appointment.    Signs and symptoms of infection, poor wound healing, and device function were reviewed. Range of motion and weight restrictions for the LEFT side are for four weeks. He was issued a [employer] NXT remote monitor and instructed on its set-up and use for remote monitoring by the [employer] representative prior to hospital discharge. These instructions were reviewed with the patient at today s visit.       Plan  Remote from home in 1 month on 8/8/2018  In clinic device check in 3 months on 10/9/2018    Teetee Reardon RN BSN PHN  Device Nurse   [facility]            
Negative

## 2022-12-13 ENCOUNTER — LABORATORY RESULT (OUTPATIENT)
Age: 52
End: 2022-12-13

## 2022-12-13 ENCOUNTER — APPOINTMENT (OUTPATIENT)
Dept: CARDIOLOGY | Facility: CLINIC | Age: 52
End: 2022-12-13
Payer: COMMERCIAL

## 2022-12-13 ENCOUNTER — NON-APPOINTMENT (OUTPATIENT)
Age: 52
End: 2022-12-13

## 2022-12-13 VITALS
WEIGHT: 222 LBS | OXYGEN SATURATION: 98 % | RESPIRATION RATE: 16 BRPM | SYSTOLIC BLOOD PRESSURE: 117 MMHG | BODY MASS INDEX: 31.08 KG/M2 | TEMPERATURE: 98 F | HEART RATE: 65 BPM | DIASTOLIC BLOOD PRESSURE: 70 MMHG | HEIGHT: 71 IN

## 2022-12-13 DIAGNOSIS — R73.03 PREDIABETES.: ICD-10-CM

## 2022-12-13 PROCEDURE — 93000 ELECTROCARDIOGRAM COMPLETE: CPT

## 2022-12-13 PROCEDURE — 99214 OFFICE O/P EST MOD 30 MIN: CPT | Mod: 25

## 2022-12-13 PROCEDURE — 93306 TTE W/DOPPLER COMPLETE: CPT

## 2022-12-13 NOTE — REVIEW OF SYSTEMS
[Negative] : Heme/Lymph [Weight Loss (___ Lbs)] : [unfilled] ~Ulb weight loss [Joint Pain] : joint pain [Joint Stiffness] : joint stiffness [Numbness (Hypoesthesia)] : numbness [Tingling (Paresthesia)] : tingling [Fever] : no fever [Headache] : no headache [Chills] : no chills [FreeTextEntry9] : left knee  [de-identified] : Left leg

## 2022-12-13 NOTE — DISCUSSION/SUMMARY
[FreeTextEntry1] : This is a 52-year-old male with past medical history significant for hyperglycemia/prediabetes, status post COVID-19 infection complicated by pneumonia, acute kidney injury, status post stroke, on Coumadin who comes in for follow-up cardiac evaluation.  He denies chest pain, shortness of breath, dizziness or syncope.\par His cardiac risk factors include hypertension, hypercholesterolemia, prediabetes/diabetes, positive family history of heart disease and occasional cigar smoking.\par He was recently placed on metformin 500 twice daily and is lost 20 pounds.\par Electrocardiogram done December 13, 2022 demonstrated normal sinus rhythm rate 67 bpm is otherwise unremarkable.\par He will have new blood work done today for lipid profile.  His LDL target is 70 mg/dL.\par Lipid profile done June 13, 2022 demonstrated cholesterol 159, HDL 53, triglycerides of 88, LDL calculated 89, non-HDL cholesterol 106, LDL direct of 85 mg/dL with hemoglobin A1c of 7.0.\par He had an abnormal nuclear stress test September 24, 2020 which demonstrated a small mild pharmacologic stress-induced area of ischemia involving the inferior apical wall.  His estimated ejection fraction is 59%.\par \par The patient had a coronary artery calcium score September 16, 2020 which was 0.\par \par The patient had blood work done September 17, 2020 which demonstrated potassium of 4.5, hemoglobin A1c of 6.8, cholesterol 226, HDL of 64, direct LDL cholesterol 142 mg/dL, and triglycerides 103 mg/dL.  His CRP was elevated 6.0.\par \par The patient will undergo cardiac catheterization this week to define his coronary artery anatomy.\par \par The patient is currently on Lipitor 40 mg daily and Zetia 10 mg/day.  He will require new blood work done.  His target LDL cholesterol goal is less than 70 mg/dL given his diabetic risk, and overall cardiovascular risk.\par \par Patient had echocardiogram in 7/2018 which showed LV EF 55-65%, mild MR, mild TR, redundant anterior mitral leaflet and trace [place]. His exercise stress test in 8/2018 which was non-ischemic. \par \par The patient had a normal exercise stress test in 2018.\par \par He is instructed to follow-up with his primary care physician and neurologist.  He will follow-up with me after the above-noted diagnostic tests are completed.
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
## 2022-12-13 NOTE — CARDIOLOGY SUMMARY
[___] : [unfilled] [de-identified] : 6/13/22 [de-identified] : 9/24/2020 [de-identified] : 9/16/2020 [de-identified] : 10/23/2020

## 2022-12-13 NOTE — PHYSICAL EXAM
[Well Developed] : well developed [Well Nourished] : well nourished [No Acute Distress] : no acute distress [Normal Venous Pressure] : normal venous pressure [No Carotid Bruit] : no carotid bruit [Normal S1, S2] : normal S1, S2 [No Murmur] : no murmur [No Rub] : no rub [Clear Lung Fields] : clear lung fields [Good Air Entry] : good air entry [No Respiratory Distress] : no respiratory distress  [Soft] : abdomen soft [Non Tender] : non-tender [No Masses/organomegaly] : no masses/organomegaly [Normal Bowel Sounds] : normal bowel sounds [Normal Gait] : normal gait [No Cyanosis] : no cyanosis [No Clubbing] : no clubbing [No Varicosities] : no varicosities [No Rash] : no rash [No Skin Lesions] : no skin lesions [Moves all extremities] : moves all extremities [No Focal Deficits] : no focal deficits [Normal Speech] : normal speech [Alert and Oriented] : alert and oriented [Normal memory] : normal memory [General Appearance - Well Developed] : well developed [Normal Appearance] : normal appearance [Well Groomed] : well groomed [General Appearance - Well Nourished] : well nourished [No Deformities] : no deformities [General Appearance - In No Acute Distress] : no acute distress [Normal Conjunctiva] : the conjunctiva exhibited no abnormalities [Normal Oral Mucosa] : normal oral mucosa [Normal Oropharynx] : normal oropharynx [Normal Jugular Venous A Waves Present] : normal jugular venous A waves present [Normal Jugular Venous V Waves Present] : normal jugular venous V waves present [No Jugular Venous Gannon A Waves] : no jugular venous gannon A waves [Respiration, Rhythm And Depth] : normal respiratory rhythm and effort [Exaggerated Use Of Accessory Muscles For Inspiration] : no accessory muscle use [Auscultation Breath Sounds / Voice Sounds] : lungs were clear to auscultation bilaterally [Bowel Sounds] : normal bowel sounds [Abdomen Soft] : soft [Abnormal Walk] : normal gait [Nail Clubbing] : no clubbing of the fingernails [Cyanosis, Localized] : no localized cyanosis [Skin Color & Pigmentation] : normal skin color and pigmentation [Skin Turgor] : normal skin turgor [] : no rash [Oriented To Time, Place, And Person] : oriented to person, place, and time [Affect] : the affect was normal [Mood] : the mood was normal [No Anxiety] : not feeling anxious [5th Left ICS - MCL] : palpated at the 5th LICS in the midclavicular line [Normal] : normal [No Precordial Heave] : no precordial heave was noted [Normal Rate] : normal [Rhythm Regular] : regular [Normal S1] : normal S1 [Normal S2] : normal S2 [No Gallop] : no gallop heard [2+] : left 2+ [No Abnormalities] : the abdominal aorta was not enlarged and no bruit was heard [No Pitting Edema] : no pitting edema present [Edema ___] : edema [unfilled] [I] : a grade 1 [S3] : no S3 [S4] : no S4 [Click] : no click [Pericardial Rub] : no pericardial rub

## 2022-12-13 NOTE — ASSESSMENT
[FreeTextEntry1] : Prior note nurse practitioner Kelly June 13, 2022::\par \par This is a 52 year year old male here today for follow up cardiac followup evaluation. \par He has a past medical history significant for hyperglycemia/prediabetes, status post COVID-19 infection complicated by pneumonia, acute kidney injury, status post stroke, on Coumadin.\par \par CHIEF COMPLAINT:\par Today he is feeling generally well and does not have any complaints at this time. \par Mr. ARMSTRONG is currently prescribed Amlodipine 5mg PO DAILY, Atorvastatin 40mg PO DAILY, Zetia 10mg  PO Daily, Lisinopril 10mg PO DAILY and on Warfarin for management of stroke.\par \par -He is a retired .\par \par -His cardiac risk factors include hypertension, hypercholesterolemia, prediabetes/diabetes, positive family history of heart disease and occasional cigar smoking. \par \par BLOOD PRESSURE:\par -BP is well controlled in today's visit.\par \par -I have discussed the importance of maintaining good BP control and reviewed the newest guidelines with the patient while re-enforcing dietary sodium restrictions to no more than 2-3 g daily, DASH diet, life style modifications as well as the goal of maintaining ideal body weight with the patient today. I have advised the patient to avoid the use of over-the-counter medications/ supplements especially NSAIDS.\par \par I have reviewed with Mr. ARMSTRONG that serious health consequences can occur when blood pressure is not well controlled and the need for strict compliance with medication and that optimal control can significantly reduce the risk of cardiovascular disease stroke, heart attack and other organ damage. They verbally expressed understanding of the fore mentioned serious health consequences to me today.\par \par BLOOD WORK:\par -New blood work was done today, 06/13/2022 to evaluate lipid profile, CBC, BMP, hepatic function, A1C and TSH\par \par CHOLESTEROL CONTROL:\par -Patient will continue the advised  TLC diet and to continue follow-up for treatment of hyperlipidemia and repeat blood testing with diet and exercise. I have discussed different exercises and the importance of maintenance of optimal body weight. The importance of staying within guidelines and recommendations was stressed to the patient today and they acknowledged that they understand this to me verbally.\par \par  -Mr. ARMSTRONG was educated and advised that failure to follow-up with my medical recommendations to lower cholesterol can result in severe health consequences therefore, they will continuing a low saturated and low fat diet and to avoid excessive carbohydrates to help reduce triglycerides and that lowering LDL levels is associated with a significant decrease in serious cardiac events including but not limited to heart attack stroke and overall death. We will continue lipid lowering agents as advised based on blood test results and the patient understands to call if they develop severe muscle discomfort or if they have a reddish tinted discoloration in their urine.\par \par TESTING/REPORTS:\par -EKG done Jun 13, 2022 which demonstrated regular sinus rhythm with nonspecific ST-T wave changes, BPM of \par TESTING:\par \par -EKG done today 8/24/2021 which demonstrated regular sinus rhythm with nonspecific ST-T wave changes BPM of 74.\par \par -He had an abnormal nuclear stress test September 24, 2020 which demonstrated a small mild pharmacologic stress-induced area of ischemia involving the inferior apical wall.  His estimated ejection fraction is 59%.\par \par -Cardiac Catheterization done on 10/23/2020 demonstrated minimal CAD.\par \par -The patient had a normal exercise stress test in 2018.\par \par -The patient had a coronary artery calcium score September 16, 2020 which was 0.\par \par -Echocardiogram done on 9/16/21 demonstrated mild mitral tricuspid and pulmonic regurgitation with normal left ventricular systolic function. EF of 66%.\par \par He was hospitalized in March 2021. \par SEE NOTE BELOW:\par \par Hospital Course: \par Discharge Date	10-Mar-2021 \par Admission Date	07-Mar-2021 19:34 \par Reason for Admission	Stroke \par Hospital Course	 \par 50 year old  male with a PMHx of COVID-19 pneumonia in April \par 2020 complicated by PATTI, CVA (mild cognitive residual effects), venous sinus \par thrombosis (on Coumadin), DM II (diet controlled), asthma, HTN, HLD presented \par to the ED with LUE and LLE numbness since 14:30. \par \par He reports that he developed his above symptoms about 14:30, he contacted his \par PMD who advised if to present to the ED if his symptoms recur and it recurred \par for about 12 times per patient. He denies any weakness, aphasia, facial \par asymmetry, chest pain, SOB, N/V. \par \par In ED: Vitals stable. Labs grossly unremarkable. INR 3.23 \par \par Hospital course: \par - CT head with no acute intracranial pathology \par - MR brain w and w/o contrast neg for acute events \par - MRA h/n wnl \par - MR cervical spine with C7 nerve impingement and C5-C6 myelomalacia versus \par demyelination; neurosx consulted, no acute intervention. Neurology ok with o/p \par w/u fir further management. No further imaging or LP needed in house. \par - rEEG wnl \par - A1c 7.0 \par - Lipids panel wnl \par - TSH wnl \par - continue Aspirin, statin; symptoms more likely due to c spine findings rather \par than stroke \par - rEEG wnl \par \par Medically stable and cleared for DC home with o/p neuro and PMD f/u. \par \par PLAN:\par -He is following up with his neurologist Dr. Delarosa...?  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 \par \par I have discussed the plan of care with Mr. SILVINA ARMSTRONG  and he  will follow up in 3 months. He is compliant with all of his medications.\par \par The patient understands that aerobic exercises must be increased to minutes 4 times/week and a detailed discussion of lifestyle modification was done today. \par The patient has a good understanding of the diagnosis, treatment plan and lifestyle modification. \par He will contact me at the office for any questions with their care or any changes in their health status.\par \par \par Kari NP \par

## 2023-01-30 ENCOUNTER — RX RENEWAL (OUTPATIENT)
Age: 53
End: 2023-01-30

## 2023-05-24 ENCOUNTER — APPOINTMENT (OUTPATIENT)
Dept: CARDIOLOGY | Facility: CLINIC | Age: 53
End: 2023-05-24
Payer: COMMERCIAL

## 2023-05-24 VITALS
RESPIRATION RATE: 16 BRPM | DIASTOLIC BLOOD PRESSURE: 78 MMHG | TEMPERATURE: 98 F | WEIGHT: 220 LBS | HEIGHT: 71 IN | HEART RATE: 87 BPM | SYSTOLIC BLOOD PRESSURE: 120 MMHG | OXYGEN SATURATION: 99 % | BODY MASS INDEX: 30.8 KG/M2

## 2023-05-24 DIAGNOSIS — Z98.890 OTHER SPECIFIED POSTPROCEDURAL STATES: ICD-10-CM

## 2023-05-24 PROCEDURE — 93015 CV STRESS TEST SUPVJ I&R: CPT

## 2023-05-24 PROCEDURE — 99213 OFFICE O/P EST LOW 20 MIN: CPT | Mod: 25

## 2023-05-24 NOTE — REVIEW OF SYSTEMS
[Weight Loss (___ Lbs)] : [unfilled] ~Ulb weight loss [Joint Pain] : joint pain [Joint Stiffness] : joint stiffness [Numbness (Hypoesthesia)] : numbness [Tingling (Paresthesia)] : tingling [Negative] : Heme/Lymph [Fever] : no fever [Headache] : no headache [Chills] : no chills [FreeTextEntry9] : left knee  [de-identified] : Left leg

## 2023-05-24 NOTE — REASON FOR VISIT
[Symptom and Test Evaluation] : symptom and test evaluation [CV Risk Factors and Non-Cardiac Disease] : CV risk factors and non-cardiac disease [Follow-Up - Clinic] : a clinic follow-up of [Coronary Artery Disease] : coronary artery disease [Hyperlipidemia] : hyperlipidemia [Hypertension] : hypertension [FreeTextEntry3] : Dr. Hogan [FreeTextEntry1] : This is a 53 year old male w/ PMH of CAD, diabetes, status post COVID-19 infection complicated by pneumonia, acute kidney injury, status post ischemic CVA 3/20 on Coumadin, COVID again in May 2022, here today for follow up cardiac evaluation. Today he is feeling generally well and does not have any other complaints at this time. He denies chest pain, headaches, dizziness, heart palpitations, recent episodes of syncope or falls at this time.\par \par He is at baseline ambulatory status and has no residual deficits from his CVA. He reports sometimes forgetting to take his BP medications but is generally compliant. He has lost 20lbs since starting Metformin for DM, and changing his diet to include less red meat. He is starting to become more active after his left knee meniscal surgery.\par  \par Medications: Amlodipine 5mg PO DAILY, Atorvastatin 40mg PO DAILY, Zetia 10mg  PO Daily, Lisinopril 10mg PO DAILY and on Warfarin for management of stroke.\par \par His cardiac risk factors include HTN, DM, family history of cardiovascular disease, but he is a non smoker and no longer drinks Etoh after his stroke.

## 2023-05-24 NOTE — PHYSICAL EXAM
[Well Developed] : well developed [Well Nourished] : well nourished [No Acute Distress] : no acute distress [Normal Venous Pressure] : normal venous pressure [No Carotid Bruit] : no carotid bruit [Normal S1, S2] : normal S1, S2 [No Murmur] : no murmur [No Rub] : no rub [Clear Lung Fields] : clear lung fields [Good Air Entry] : good air entry [No Respiratory Distress] : no respiratory distress  [Soft] : abdomen soft [Non Tender] : non-tender [No Masses/organomegaly] : no masses/organomegaly [Normal Bowel Sounds] : normal bowel sounds [Normal Gait] : normal gait [No Cyanosis] : no cyanosis [No Clubbing] : no clubbing [No Varicosities] : no varicosities [Edema ___] : edema [unfilled] [No Rash] : no rash [No Skin Lesions] : no skin lesions [Moves all extremities] : moves all extremities [No Focal Deficits] : no focal deficits [Normal Speech] : normal speech [Alert and Oriented] : alert and oriented [Normal memory] : normal memory [General Appearance - Well Developed] : well developed [Normal Appearance] : normal appearance [Well Groomed] : well groomed [General Appearance - Well Nourished] : well nourished [No Deformities] : no deformities [General Appearance - In No Acute Distress] : no acute distress [Normal Conjunctiva] : the conjunctiva exhibited no abnormalities [Normal Oral Mucosa] : normal oral mucosa [Normal Oropharynx] : normal oropharynx [Normal Jugular Venous A Waves Present] : normal jugular venous A waves present [Normal Jugular Venous V Waves Present] : normal jugular venous V waves present [No Jugular Venous Gannon A Waves] : no jugular venous gannon A waves [Respiration, Rhythm And Depth] : normal respiratory rhythm and effort [Exaggerated Use Of Accessory Muscles For Inspiration] : no accessory muscle use [Auscultation Breath Sounds / Voice Sounds] : lungs were clear to auscultation bilaterally [Bowel Sounds] : normal bowel sounds [Abdomen Soft] : soft [Abnormal Walk] : normal gait [Cyanosis, Localized] : no localized cyanosis [Nail Clubbing] : no clubbing of the fingernails [Skin Color & Pigmentation] : normal skin color and pigmentation [Skin Turgor] : normal skin turgor [] : no rash [Oriented To Time, Place, And Person] : oriented to person, place, and time [Affect] : the affect was normal [Mood] : the mood was normal [No Anxiety] : not feeling anxious [5th Left ICS - MCL] : palpated at the 5th LICS in the midclavicular line [Normal] : normal [No Precordial Heave] : no precordial heave was noted [Normal Rate] : normal [Rhythm Regular] : regular [Normal S1] : normal S1 [Normal S2] : normal S2 [No Gallop] : no gallop heard [I] : a grade 1 [2+] : left 2+ [No Abnormalities] : the abdominal aorta was not enlarged and no bruit was heard [No Pitting Edema] : no pitting edema present [S4] : no S4 [S3] : no S3 [Click] : no click [Pericardial Rub] : no pericardial rub

## 2023-05-24 NOTE — DISCUSSION/SUMMARY
[FreeTextEntry1] : This is a 52-year-old male with past medical history significant for hyperglycemia/prediabetes, status post COVID-19 infection complicated by pneumonia, acute kidney injury, status post stroke, on Coumadin who comes in for follow-up cardiac evaluation.  He denies chest pain, shortness of breath, dizziness or syncope.\par His cardiac risk factors include hypertension, hypercholesterolemia, prediabetes/diabetes, positive family history of heart disease and occasional cigar smoking.\par The patient had a normal exercise stress test May 24, 2023.\par He will continue on his current diet and exercise program.\par Lipid panel done December 13, 2022 demonstrated cholesterol 143, HDL of 59, triglycerides 73, LDL direct 71 mg/dL and non-HDL cholesterol of 84 mg/dL with hemoglobin A1c of 6.7.  He will follow-up with his primary care physician regarding his elevated hemoglobin A1c.\par He continues on Lipitor 40 mg daily and Zetia 10 mg/day.  Compliance with the medication was reinforced.  He will have new blood work done today for lipid panel with an LDL target of 70 mg/dL.\par \par He was recently placed on metformin 500 twice daily and is lost 20 pounds.\par Electrocardiogram done December 13, 2022 demonstrated normal sinus rhythm rate 67 bpm is otherwise unremarkable.\par He will have new blood work done today for lipid profile.  His LDL target is 70 mg/dL.\par Lipid profile done June 13, 2022 demonstrated cholesterol 159, HDL 53, triglycerides of 88, LDL calculated 89, non-HDL cholesterol 106, LDL direct of 85 mg/dL with hemoglobin A1c of 7.0.\par He had an abnormal nuclear stress test September 24, 2020 which demonstrated a small mild pharmacologic stress-induced area of ischemia involving the inferior apical wall.  His estimated ejection fraction is 59%.\par \par The patient had a coronary artery calcium score September 16, 2020 which was 0.\par \par The patient had blood work done September 17, 2020 which demonstrated potassium of 4.5, hemoglobin A1c of 6.8, cholesterol 226, HDL of 64, direct LDL cholesterol 142 mg/dL, and triglycerides 103 mg/dL.  His CRP was elevated 6.0.\par Cardiac catheterization done October 23, 2020 demonstrated normal left main artery, normal left anterior descending artery with minor irregularities., normal circumflex artery, normal obtuse marginal 1 and 2, minor irregularities in the right coronary artery without obstruction\par \par Patient had echocardiogram in 7/2018 which showed LV EF 55-65%, mild MR, mild TR, redundant anterior mitral leaflet and trace OH. His exercise stress test in 8/2018 which was non-ischemic. \par \par The patient had a normal exercise stress test in 2018.\par \par The patient understands that aerobic exercises must be increased to 40 minutes 4 times per week. A detailed discussion of lifestyle modification was done today. The patient has a good understanding of the diagnosis, and treatment plan. Lifestyle modification was also outlined.

## 2023-05-24 NOTE — CARDIOLOGY SUMMARY
[___] : [unfilled] [de-identified] : 6/13/22 [de-identified] : 9/24/2020 [de-identified] : 9/16/2020 [de-identified] : 10/23/2020

## 2023-05-24 NOTE — ASSESSMENT
[FreeTextEntry1] : Prior note nurse practitioner Kelly June 13, 2022::\par \par This is a 52 year year old male here today for follow up cardiac followup evaluation. \par He has a past medical history significant for hyperglycemia/prediabetes, status post COVID-19 infection complicated by pneumonia, acute kidney injury, status post stroke, on Coumadin.\par \par CHIEF COMPLAINT:\par Today he is feeling generally well and does not have any complaints at this time. \par Mr. ARMSTRONG is currently prescribed Amlodipine 5mg PO DAILY, Atorvastatin 40mg PO DAILY, Zetia 10mg  PO Daily, Lisinopril 10mg PO DAILY and on Warfarin for management of stroke.\par \par -He is a retired .\par \par -His cardiac risk factors include hypertension, hypercholesterolemia, prediabetes/diabetes, positive family history of heart disease and occasional cigar smoking. \par \par BLOOD PRESSURE:\par -BP is well controlled in today's visit.\par \par -I have discussed the importance of maintaining good BP control and reviewed the newest guidelines with the patient while re-enforcing dietary sodium restrictions to no more than 2-3 g daily, DASH diet, life style modifications as well as the goal of maintaining ideal body weight with the patient today. I have advised the patient to avoid the use of over-the-counter medications/ supplements especially NSAIDS.\par \par I have reviewed with Mr. ARSMTRONG that serious health consequences can occur when blood pressure is not well controlled and the need for strict compliance with medication and that optimal control can significantly reduce the risk of cardiovascular disease stroke, heart attack and other organ damage. They verbally expressed understanding of the fore mentioned serious health consequences to me today.\par \par BLOOD WORK:\par -New blood work was done today, 06/13/2022 to evaluate lipid profile, CBC, BMP, hepatic function, A1C and TSH\par \par CHOLESTEROL CONTROL:\par -Patient will continue the advised  TLC diet and to continue follow-up for treatment of hyperlipidemia and repeat blood testing with diet and exercise. I have discussed different exercises and the importance of maintenance of optimal body weight. The importance of staying within guidelines and recommendations was stressed to the patient today and they acknowledged that they understand this to me verbally.\par \par  -Mr. ARMSTRONG was educated and advised that failure to follow-up with my medical recommendations to lower cholesterol can result in severe health consequences therefore, they will continuing a low saturated and low fat diet and to avoid excessive carbohydrates to help reduce triglycerides and that lowering LDL levels is associated with a significant decrease in serious cardiac events including but not limited to heart attack stroke and overall death. We will continue lipid lowering agents as advised based on blood test results and the patient understands to call if they develop severe muscle discomfort or if they have a reddish tinted discoloration in their urine.\par \par TESTING/REPORTS:\par -EKG done Jun 13, 2022 which demonstrated regular sinus rhythm with nonspecific ST-T wave changes, BPM of \par TESTING:\par \par -EKG done today 8/24/2021 which demonstrated regular sinus rhythm with nonspecific ST-T wave changes BPM of 74.\par \par -He had an abnormal nuclear stress test September 24, 2020 which demonstrated a small mild pharmacologic stress-induced area of ischemia involving the inferior apical wall.  His estimated ejection fraction is 59%.\par \par -Cardiac Catheterization done on 10/23/2020 demonstrated minimal CAD.\par \par -The patient had a normal exercise stress test in 2018.\par \par -The patient had a coronary artery calcium score September 16, 2020 which was 0.\par \par -Echocardiogram done on 9/16/21 demonstrated mild mitral tricuspid and pulmonic regurgitation with normal left ventricular systolic function. EF of 66%.\par \par He was hospitalized in March 2021. \par SEE NOTE BELOW:\par \par Hospital Course: \par Discharge Date	10-Mar-2021 \par Admission Date	07-Mar-2021 19:34 \par Reason for Admission	Stroke \par Hospital Course	 \par 50 year old  male with a PMHx of COVID-19 pneumonia in April \par 2020 complicated by PATTI, CVA (mild cognitive residual effects), venous sinus \par thrombosis (on Coumadin), DM II (diet controlled), asthma, HTN, HLD presented \par to the ED with LUE and LLE numbness since 14:30. \par \par He reports that he developed his above symptoms about 14:30, he contacted his \par PMD who advised if to present to the ED if his symptoms recur and it recurred \par for about 12 times per patient. He denies any weakness, aphasia, facial \par asymmetry, chest pain, SOB, N/V. \par \par In ED: Vitals stable. Labs grossly unremarkable. INR 3.23 \par \par Hospital course: \par - CT head with no acute intracranial pathology \par - MR brain w and w/o contrast neg for acute events \par - MRA h/n wnl \par - MR cervical spine with C7 nerve impingement and C5-C6 myelomalacia versus \par demyelination; neurosx consulted, no acute intervention. Neurology ok with o/p \par w/u fir further management. No further imaging or LP needed in house. \par - rEEG wnl \par - A1c 7.0 \par - Lipids panel wnl \par - TSH wnl \par - continue Aspirin, statin; symptoms more likely due to c spine findings rather \par than stroke \par - rEEG wnl \par \par Medically stable and cleared for DC home with o/p neuro and PMD f/u. \par \par PLAN:\par -He is following up with his neurologist Dr. Delarosa...?  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 \par \par I have discussed the plan of care with Mr. SILVINA ARMSTRONG  and he  will follow up in 3 months. He is compliant with all of his medications.\par \par The patient understands that aerobic exercises must be increased to minutes 4 times/week and a detailed discussion of lifestyle modification was done today. \par The patient has a good understanding of the diagnosis, treatment plan and lifestyle modification. \par He will contact me at the office for any questions with their care or any changes in their health status.\par \par \par Kari NP \par

## 2023-05-25 RX ORDER — ERGOCALCIFEROL 1.25 MG/1
1.25 MG CAPSULE, LIQUID FILLED ORAL
Qty: 4 | Refills: 0 | Status: COMPLETED | COMMUNITY
Start: 2022-01-26 | End: 2023-05-25

## 2023-05-25 RX ORDER — MONTELUKAST 10 MG/1
10 TABLET, FILM COATED ORAL
Qty: 30 | Refills: 2 | Status: COMPLETED | COMMUNITY
End: 2023-05-25

## 2023-06-09 ENCOUNTER — RX CHANGE (OUTPATIENT)
Age: 53
End: 2023-06-09

## 2023-06-14 ENCOUNTER — RX CHANGE (OUTPATIENT)
Age: 53
End: 2023-06-14

## 2023-09-05 ENCOUNTER — RX RENEWAL (OUTPATIENT)
Age: 53
End: 2023-09-05

## 2023-09-07 ENCOUNTER — RX RENEWAL (OUTPATIENT)
Age: 53
End: 2023-09-07

## 2023-09-15 ENCOUNTER — OUTPATIENT (OUTPATIENT)
Dept: OUTPATIENT SERVICES | Facility: HOSPITAL | Age: 53
LOS: 1 days | End: 2023-09-15
Payer: COMMERCIAL

## 2023-09-15 DIAGNOSIS — R51.9 HEADACHE, UNSPECIFIED: ICD-10-CM

## 2023-09-15 DIAGNOSIS — Z00.8 ENCOUNTER FOR OTHER GENERAL EXAMINATION: ICD-10-CM

## 2023-09-15 PROCEDURE — A9579: CPT

## 2023-09-15 PROCEDURE — 70553 MRI BRAIN STEM W/O & W/DYE: CPT | Mod: 26

## 2023-09-15 PROCEDURE — 70553 MRI BRAIN STEM W/O & W/DYE: CPT

## 2023-09-16 DIAGNOSIS — R51.9 HEADACHE, UNSPECIFIED: ICD-10-CM

## 2023-11-13 ENCOUNTER — APPOINTMENT (OUTPATIENT)
Dept: CARDIOLOGY | Facility: CLINIC | Age: 53
End: 2023-11-13

## 2023-12-01 ENCOUNTER — APPOINTMENT (OUTPATIENT)
Dept: ORTHOPEDIC SURGERY | Facility: CLINIC | Age: 53
End: 2023-12-01
Payer: COMMERCIAL

## 2023-12-01 VITALS — BODY MASS INDEX: 30.8 KG/M2 | HEIGHT: 71 IN | WEIGHT: 220 LBS

## 2023-12-01 DIAGNOSIS — M54.50 LOW BACK PAIN, UNSPECIFIED: ICD-10-CM

## 2023-12-01 PROCEDURE — 99203 OFFICE O/P NEW LOW 30 MIN: CPT

## 2023-12-01 PROCEDURE — 72170 X-RAY EXAM OF PELVIS: CPT

## 2023-12-01 PROCEDURE — 72100 X-RAY EXAM L-S SPINE 2/3 VWS: CPT

## 2023-12-01 RX ORDER — METHYLPREDNISOLONE 4 MG/1
4 TABLET ORAL
Qty: 1 | Refills: 0 | Status: ACTIVE | COMMUNITY
Start: 2023-12-01 | End: 1900-01-01

## 2023-12-04 ENCOUNTER — RX RENEWAL (OUTPATIENT)
Age: 53
End: 2023-12-04

## 2023-12-26 ENCOUNTER — RX RENEWAL (OUTPATIENT)
Age: 53
End: 2023-12-26

## 2023-12-27 ENCOUNTER — RX RENEWAL (OUTPATIENT)
Age: 53
End: 2023-12-27

## 2023-12-28 ENCOUNTER — RX RENEWAL (OUTPATIENT)
Age: 53
End: 2023-12-28

## 2023-12-29 ENCOUNTER — RX RENEWAL (OUTPATIENT)
Age: 53
End: 2023-12-29

## 2024-01-16 ENCOUNTER — RX RENEWAL (OUTPATIENT)
Age: 54
End: 2024-01-16

## 2024-01-16 ENCOUNTER — APPOINTMENT (OUTPATIENT)
Dept: ORTHOPEDIC SURGERY | Facility: CLINIC | Age: 54
End: 2024-01-16
Payer: COMMERCIAL

## 2024-01-16 DIAGNOSIS — S39.012A STRAIN OF MUSCLE, FASCIA AND TENDON OF LOWER BACK, INITIAL ENCOUNTER: ICD-10-CM

## 2024-01-16 DIAGNOSIS — M54.16 RADICULOPATHY, LUMBAR REGION: ICD-10-CM

## 2024-01-16 PROCEDURE — 99213 OFFICE O/P EST LOW 20 MIN: CPT

## 2024-01-16 RX ORDER — CYCLOBENZAPRINE HYDROCHLORIDE 10 MG/1
10 TABLET, FILM COATED ORAL 3 TIMES DAILY
Qty: 45 | Refills: 0 | Status: ACTIVE | COMMUNITY
Start: 2023-12-01 | End: 1900-01-01

## 2024-01-16 NOTE — HISTORY OF PRESENT ILLNESS
[Dull/Aching] : dull/aching [2] : 2 [Intermittent] : intermittent [Ice] : ice [Heat] : heat [Lying in bed] : lying in bed [de-identified] : Patient is a 53-year-old male presenting for evaluation of 3 weeks of right lower back pain with radiation of pain down the right leg after feeling a pop in his back while picking up a case of water.  He states pain improves with walking and is worse with laying down.  At night, he feels radiation of pain numbness down the lateral aspect of his leg all the way to the lateral aspect of his foot.  He has never had this problem in the past.  He has not yet tried any medication for his symptoms.  He denies any obvious weakness of the leg.  Of note, he is diabetic and on warfarin.    01/16/2024: SILVINA is here today to follow up on his lower back. pt states pain decreased since last visit. completed MDP, finished muscle relaxer. using hot/cold compress for intermittent back pain. doing HEP. denies radiating pain down lower extremities. Denies new injury.  [] : no [FreeTextEntry1] : Lower Back

## 2024-01-16 NOTE — ASSESSMENT
[FreeTextEntry1] : F/u of lumbar back pain. Symptoms improved, exam improved -Flexeril PRN - HEP - Follow up in 2 months if needed.

## 2024-01-16 NOTE — PHYSICAL EXAM
[de-identified] : Constitutional: Well developed, well nourished, able to communicate Neuro: Normal sensation, No focal deficits Skin: Intact CV: Peripheral vascular exam grossly normal Pulm: No signs of respiratory distress Psych: Oriented, normal mood and affect

## 2024-01-16 NOTE — IMAGING
[Straightening consistent with spasm] : Straightening consistent with spasm [Disc space narrowing] : Disc space narrowing [AP] : anteroposterior [There are no fractures, subluxations or dislocations. No significant abnormalities are seen] : There are no fractures, subluxations or dislocations. No significant abnormalities are seen [de-identified] : Back: - No obvious deformity - No Pain with palpation over the right lumbosacral paraspinals - No pain with palpation of spinous processes - No pain with SI palpation - ROM intact throughout flexion, extension, sidebending, and rotation. No pain with extension and forward flexion - 5/5 strength throughout LE evaluation bilaterally - No pain with MICA - Negative straight leg raise - Distally neurovascularly intact

## 2024-01-19 ENCOUNTER — RX RENEWAL (OUTPATIENT)
Age: 54
End: 2024-01-19

## 2024-01-19 RX ORDER — AMLODIPINE BESYLATE 5 MG/1
5 TABLET ORAL
Qty: 90 | Refills: 1 | Status: DISCONTINUED | COMMUNITY
Start: 2022-03-28 | End: 2024-01-19

## 2024-01-29 ENCOUNTER — RX RENEWAL (OUTPATIENT)
Age: 54
End: 2024-01-29

## 2024-01-31 ENCOUNTER — RX RENEWAL (OUTPATIENT)
Age: 54
End: 2024-01-31

## 2024-01-31 RX ORDER — AMLODIPINE BESYLATE 5 MG/1
5 TABLET ORAL
Qty: 90 | Refills: 1 | Status: ACTIVE | COMMUNITY
Start: 2024-01-19 | End: 1900-01-01

## 2024-01-31 RX ORDER — EZETIMIBE 10 MG/1
10 TABLET ORAL
Qty: 90 | Refills: 1 | Status: ACTIVE | COMMUNITY
Start: 2020-09-21 | End: 1900-01-01

## 2024-03-04 ENCOUNTER — RX RENEWAL (OUTPATIENT)
Age: 54
End: 2024-03-04

## 2024-03-04 ENCOUNTER — APPOINTMENT (OUTPATIENT)
Dept: CARDIOLOGY | Facility: CLINIC | Age: 54
End: 2024-03-04

## 2024-03-06 ENCOUNTER — RX RENEWAL (OUTPATIENT)
Age: 54
End: 2024-03-06

## 2024-03-12 ENCOUNTER — APPOINTMENT (OUTPATIENT)
Dept: ORTHOPEDIC SURGERY | Facility: CLINIC | Age: 54
End: 2024-03-12

## 2024-04-02 ENCOUNTER — RX RENEWAL (OUTPATIENT)
Age: 54
End: 2024-04-02

## 2024-04-04 ENCOUNTER — RX RENEWAL (OUTPATIENT)
Age: 54
End: 2024-04-04

## 2024-04-09 ENCOUNTER — LABORATORY RESULT (OUTPATIENT)
Age: 54
End: 2024-04-09

## 2024-04-09 ENCOUNTER — NON-APPOINTMENT (OUTPATIENT)
Age: 54
End: 2024-04-09

## 2024-04-09 ENCOUNTER — APPOINTMENT (OUTPATIENT)
Dept: CARDIOLOGY | Facility: CLINIC | Age: 54
End: 2024-04-09
Payer: COMMERCIAL

## 2024-04-09 VITALS
OXYGEN SATURATION: 97 % | HEIGHT: 71 IN | HEART RATE: 86 BPM | DIASTOLIC BLOOD PRESSURE: 94 MMHG | BODY MASS INDEX: 33.6 KG/M2 | SYSTOLIC BLOOD PRESSURE: 142 MMHG | RESPIRATION RATE: 16 BRPM | WEIGHT: 240 LBS | TEMPERATURE: 98 F

## 2024-04-09 VITALS
BODY MASS INDEX: 33.6 KG/M2 | WEIGHT: 240 LBS | OXYGEN SATURATION: 98 % | RESPIRATION RATE: 16 BRPM | HEIGHT: 71 IN | HEART RATE: 86 BPM

## 2024-04-09 DIAGNOSIS — E11.9 TYPE 2 DIABETES MELLITUS W/OUT COMPLICATIONS: ICD-10-CM

## 2024-04-09 DIAGNOSIS — Z86.73 PERSONAL HISTORY OF TRANSIENT ISCHEMIC ATTACK (TIA), AND CEREBRAL INFARCTION W/OUT RESIDUAL DEFICITS: ICD-10-CM

## 2024-04-09 DIAGNOSIS — I25.10 ATHEROSCLEROTIC HEART DISEASE OF NATIVE CORONARY ARTERY W/OUT ANGINA PECTORIS: ICD-10-CM

## 2024-04-09 DIAGNOSIS — E78.5 HYPERLIPIDEMIA, UNSPECIFIED: ICD-10-CM

## 2024-04-09 DIAGNOSIS — I34.0 NONRHEUMATIC MITRAL (VALVE) INSUFFICIENCY: ICD-10-CM

## 2024-04-09 DIAGNOSIS — I10 ESSENTIAL (PRIMARY) HYPERTENSION: ICD-10-CM

## 2024-04-09 PROCEDURE — G2211 COMPLEX E/M VISIT ADD ON: CPT

## 2024-04-09 PROCEDURE — 99214 OFFICE O/P EST MOD 30 MIN: CPT

## 2024-04-09 PROCEDURE — 93000 ELECTROCARDIOGRAM COMPLETE: CPT

## 2024-04-09 RX ORDER — TELMISARTAN AND HYDROCHLOROTHIAZIDE 80; 12.5 MG/1; MG/1
80-12.5 TABLET ORAL
Qty: 90 | Refills: 1 | Status: ACTIVE | COMMUNITY
Start: 2024-04-09 | End: 1900-01-01

## 2024-04-09 RX ORDER — LISINOPRIL 10 MG/1
10 TABLET ORAL
Qty: 90 | Refills: 1 | Status: COMPLETED | COMMUNITY
End: 2024-04-09

## 2024-04-09 NOTE — REVIEW OF SYSTEMS
[Weight Loss (___ Lbs)] : [unfilled] ~Ulb weight loss [Joint Pain] : joint pain [Joint Stiffness] : joint stiffness [Numbness (Hypoesthesia)] : numbness [Tingling (Paresthesia)] : tingling [Negative] : Heme/Lymph [Fever] : no fever [Headache] : no headache [Chills] : no chills [FreeTextEntry9] : left knee  [de-identified] : Left leg

## 2024-04-09 NOTE — PHYSICAL EXAM
[Well Developed] : well developed [Well Nourished] : well nourished [No Acute Distress] : no acute distress [Normal Venous Pressure] : normal venous pressure [No Carotid Bruit] : no carotid bruit [Normal S1, S2] : normal S1, S2 [No Rub] : no rub [Clear Lung Fields] : clear lung fields [Good Air Entry] : good air entry [No Respiratory Distress] : no respiratory distress  [Soft] : abdomen soft [Non Tender] : non-tender [No Masses/organomegaly] : no masses/organomegaly [Normal Bowel Sounds] : normal bowel sounds [Normal Gait] : normal gait [No Cyanosis] : no cyanosis [No Clubbing] : no clubbing [No Varicosities] : no varicosities [Edema ___] : edema [unfilled] [No Rash] : no rash [No Skin Lesions] : no skin lesions [Moves all extremities] : moves all extremities [No Focal Deficits] : no focal deficits [Normal Speech] : normal speech [Alert and Oriented] : alert and oriented [Normal memory] : normal memory [General Appearance - Well Developed] : well developed [Normal Appearance] : normal appearance [Well Groomed] : well groomed [General Appearance - Well Nourished] : well nourished [No Deformities] : no deformities [General Appearance - In No Acute Distress] : no acute distress [Normal Conjunctiva] : the conjunctiva exhibited no abnormalities [Normal Oral Mucosa] : normal oral mucosa [Normal Oropharynx] : normal oropharynx [Normal Jugular Venous A Waves Present] : normal jugular venous A waves present [Normal Jugular Venous V Waves Present] : normal jugular venous V waves present [No Jugular Venous Gannon A Waves] : no jugular venous gannon A waves [Respiration, Rhythm And Depth] : normal respiratory rhythm and effort [Exaggerated Use Of Accessory Muscles For Inspiration] : no accessory muscle use [Auscultation Breath Sounds / Voice Sounds] : lungs were clear to auscultation bilaterally [Bowel Sounds] : normal bowel sounds [Abdomen Soft] : soft [Abnormal Walk] : normal gait [Nail Clubbing] : no clubbing of the fingernails [Cyanosis, Localized] : no localized cyanosis [Skin Color & Pigmentation] : normal skin color and pigmentation [Skin Turgor] : normal skin turgor [] : no rash [Oriented To Time, Place, And Person] : oriented to person, place, and time [Affect] : the affect was normal [Mood] : the mood was normal [No Anxiety] : not feeling anxious [No Gallop] : no gallop heard [I] : a grade 1 [5th Left ICS - MCL] : palpated at the 5th LICS in the midclavicular line [Normal] : normal [No Precordial Heave] : no precordial heave was noted [Normal Rate] : normal [Rhythm Regular] : regular [Normal S1] : normal S1 [Normal S2] : normal S2 [II] : a grade 2 [No Pitting Edema] : no pitting edema present [2+] : left 2+ [No Abnormalities] : the abdominal aorta was not enlarged and no bruit was heard [Distant] : the heart sounds were ~L not distant [Rt] : no varicose veins of the right leg [Lt] : no varicose veins of the left leg [Right Carotid Bruit] : no bruit heard over the right carotid [Left Carotid Bruit] : no bruit heard over the left carotid [Right Femoral Bruit] : no bruit heard over the right femoral artery [Left Femoral Bruit] : no bruit heard over the left femoral artery [Bruit] : no bruit heard [S3] : no S3 [S4] : no S4 [Click] : no click [Pericardial Rub] : no pericardial rub

## 2024-04-09 NOTE — DISCUSSION/SUMMARY
[FreeTextEntry1] : This is a 53-year-old male with past medical history significant for hyperglycemia/prediabetes, status post COVID-19 infection complicated by pneumonia, acute kidney injury, status post stroke, on Coumadin who comes in for follow-up cardiac evaluation.   He denies chest pain, shortness of breath, dizziness or syncope. His cardiac risk factors include hypertension, hypercholesterolemia, prediabetes/diabetes, positive family history of heart disease and occasional cigar smoking. Electrocardiogram done April 9, 2024 demonstrated normal sinus rhythm rate of 81 bpm and is otherwise unremarkable. The patient's blood pressure is elevated today.  Will discontinue his lisinopril.  He will start Micardis hydrochlorothiazide 80/12.5 mg daily. He will speak to his primary care physician about increasing his Ozempic to 1 mg daily from the 0.5 mg dose to achieve further weight loss.  He will follow-up with me in 4 to 6 weeks to recheck his blood pressure. The patient had a normal exercise stress test May 24, 2023. Lipid panel done December 13, 2022 demonstrated cholesterol 143, HDL of 59, triglycerides 73, LDL direct 71 mg/dL and non-HDL cholesterol of 84 mg/dL with hemoglobin A1c of 6.7.  He will follow-up with his primary care physician regarding his elevated hemoglobin A1c. He continues on Lipitor 40 mg daily and Zetia 10 mg/day.  Compliance with the medication was reinforced.  He will have new blood work done today for lipid panel with an LDL target of 70 mg/dL.  He was recently placed on metformin 500 twice daily and is lost 20 pounds. Electrocardiogram done December 13, 2022 demonstrated normal sinus rhythm rate 67 bpm is otherwise unremarkable. His LDL target is 70 mg/dL. Lipid profile done June 13, 2022 demonstrated cholesterol 159, HDL 53, triglycerides of 88, LDL calculated 89, non-HDL cholesterol 106, LDL direct of 85 mg/dL with hemoglobin A1c of 7.0. He had an abnormal nuclear stress test September 24, 2020 which demonstrated a small mild pharmacologic stress-induced area of ischemia involving the inferior apical wall.  His estimated ejection fraction is 59%.  The patient had a coronary artery calcium score September 16, 2020 which was 0.  The patient had blood work done September 17, 2020 which demonstrated potassium of 4.5, hemoglobin A1c of 6.8, cholesterol 226, HDL of 64, direct LDL cholesterol 142 mg/dL, and triglycerides 103 mg/dL.  His CRP was elevated 6.0. Cardiac catheterization done October 23, 2020 demonstrated normal left main artery, normal left anterior descending artery with minor irregularities., normal circumflex artery, normal obtuse marginal 1 and 2, minor irregularities in the right coronary artery without obstruction  Patient had echocardiogram in 7/2018 which showed LV EF 55-65%, mild MR, mild TR, redundant anterior mitral leaflet and trace ND. His exercise stress test in 8/2018 which was non-ischemic.   The patient had a normal exercise stress test in 2018.  The patient understands that aerobic exercises must be increased to 40 minutes 4 times per week. A detailed discussion of lifestyle modification was done today. The patient has a good understanding of the diagnosis, and treatment plan. Lifestyle modification was also outlined.

## 2024-04-09 NOTE — REASON FOR VISIT
[Symptom and Test Evaluation] : symptom and test evaluation [CV Risk Factors and Non-Cardiac Disease] : CV risk factors and non-cardiac disease [Follow-Up - Clinic] : a clinic follow-up of [Coronary Artery Disease] : coronary artery disease [Hyperlipidemia] : hyperlipidemia [Hypertension] : hypertension [FreeTextEntry3] : Dr. Hogan [FreeTextEntry1] : This is a 53 year old male w/ PMH of CAD, diabetes, status post COVID-19 infection complicated by pneumonia, acute kidney injury, status post ischemic CVA 3/20 on Coumadin, COVID again in May 2022, here today for follow up cardiac evaluation. He has had a consistent headache since Sunday and noticed he has had elevated blood pressures in the 140s-150s/90s since the headache began. He denies chest pain, SOB, dizziness, heart palpitations, recent episodes of syncope or falls at this time.  Medications: Amlodipine 5mg PO DAILY, Atorvastatin 40mg PO DAILY, Lisinopril 10mg PO DAILY, Zetia 10mg PO DAILY, and on Warfarin for management of stroke..  His cardiac risk factors include HTN, DM, family history of cardiovascular disease, but he is a non smoker and no longer drinks Etoh after his stroke.

## 2024-04-09 NOTE — CARDIOLOGY SUMMARY
[___] : [unfilled] [de-identified] : 6/13/22 [de-identified] : 9/24/2020 [de-identified] : 9/16/2020 [de-identified] : 10/23/2020

## 2024-04-09 NOTE — ASSESSMENT
[FreeTextEntry1] : Prior note nurse practitioner Kelly June 13, 2022::\par  \par  This is a 52 year year old male here today for follow up cardiac followup evaluation. \par  He has a past medical history significant for hyperglycemia/prediabetes, status post COVID-19 infection complicated by pneumonia, acute kidney injury, status post stroke, on Coumadin.\par  \par  CHIEF COMPLAINT:\par  Today he is feeling generally well and does not have any complaints at this time. \par  Mr. ARMSRTONG is currently prescribed Amlodipine 5mg PO DAILY, Atorvastatin 40mg PO DAILY, Zetia 10mg  PO Daily, Lisinopril 10mg PO DAILY and on Warfarin for management of stroke.\par  \par  -He is a retired .\par  \par  -His cardiac risk factors include hypertension, hypercholesterolemia, prediabetes/diabetes, positive family history of heart disease and occasional cigar smoking. \par  \par  BLOOD PRESSURE:\par  -BP is well controlled in today's visit.\par  \par  -I have discussed the importance of maintaining good BP control and reviewed the newest guidelines with the patient while re-enforcing dietary sodium restrictions to no more than 2-3 g daily, DASH diet, life style modifications as well as the goal of maintaining ideal body weight with the patient today. I have advised the patient to avoid the use of over-the-counter medications/ supplements especially NSAIDS.\par  \par  I have reviewed with Mr. ARMSTRONG that serious health consequences can occur when blood pressure is not well controlled and the need for strict compliance with medication and that optimal control can significantly reduce the risk of cardiovascular disease stroke, heart attack and other organ damage. They verbally expressed understanding of the fore mentioned serious health consequences to me today.\par  \par  BLOOD WORK:\par  -New blood work was done today, 06/13/2022 to evaluate lipid profile, CBC, BMP, hepatic function, A1C and TSH\par  \par  CHOLESTEROL CONTROL:\par  -Patient will continue the advised  TLC diet and to continue follow-up for treatment of hyperlipidemia and repeat blood testing with diet and exercise. I have discussed different exercises and the importance of maintenance of optimal body weight. The importance of staying within guidelines and recommendations was stressed to the patient today and they acknowledged that they understand this to me verbally.\par  \par   -Mr. ARMSTRONG was educated and advised that failure to follow-up with my medical recommendations to lower cholesterol can result in severe health consequences therefore, they will continuing a low saturated and low fat diet and to avoid excessive carbohydrates to help reduce triglycerides and that lowering LDL levels is associated with a significant decrease in serious cardiac events including but not limited to heart attack stroke and overall death. We will continue lipid lowering agents as advised based on blood test results and the patient understands to call if they develop severe muscle discomfort or if they have a reddish tinted discoloration in their urine.\par  \par  TESTING/REPORTS:\par  -EKG done Jun 13, 2022 which demonstrated regular sinus rhythm with nonspecific ST-T wave changes, BPM of \par  TESTING:\par  \par  -EKG done today 8/24/2021 which demonstrated regular sinus rhythm with nonspecific ST-T wave changes BPM of 74.\par  \par  -He had an abnormal nuclear stress test September 24, 2020 which demonstrated a small mild pharmacologic stress-induced area of ischemia involving the inferior apical wall.  His estimated ejection fraction is 59%.\par  \par  -Cardiac Catheterization done on 10/23/2020 demonstrated minimal CAD.\par  \par  -The patient had a normal exercise stress test in 2018.\par  \par  -The patient had a coronary artery calcium score September 16, 2020 which was 0.\par  \par  -Echocardiogram done on 9/16/21 demonstrated mild mitral tricuspid and pulmonic regurgitation with normal left ventricular systolic function. EF of 66%.\par  \par  He was hospitalized in March 2021. 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 \par  \par  He reports that he developed his above symptoms about 14:30, he contacted his \par  PMD who advised if to present to the ED if his symptoms recur and it recurred \par  for about 12 times per patient. He denies any weakness, aphasia, facial \par  asymmetry, chest pain, SOB, N/V. \par  \par  In ED: Vitals stable. Labs grossly unremarkable. INR 3.23 \par  \par  Hospital course: \par  - CT head with no acute intracranial pathology \par  - MR brain w and w/o contrast neg for acute events \par  - MRA h/n wnl \par  - MR cervical spine with C7 nerve impingement and C5-C6 myelomalacia versus \par  demyelination; neurosx consulted, no acute intervention. Neurology ok with o/p \par  w/u fir further management. No further imaging or LP needed in house. \par  - rEEG wnl \par  - A1c 7.0 \par  - Lipids panel wnl \par  - TSH wnl \par  - continue Aspirin, statin; symptoms more likely due to c spine findings rather \par  than stroke \par  - rEEG wnl \par  \par  Medically stable and cleared for DC home with o/p neuro and PMD f/u. \par  \par  PLAN:\par  -He is following up with his neurologist Dr. Delarosa...?  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 \par  \par  I have discussed the plan of care with Mr. SILVINA ARMSTRONG  and he  will follow up in 3 months. He is compliant with all of his medications.\par  \par  The patient understands that aerobic exercises must be increased to minutes 4 times/week and a detailed discussion of lifestyle modification was done today. \par  The patient has a good understanding of the diagnosis, treatment plan and lifestyle modification. \par  He will contact me at the office for any questions with their care or any changes in their health status.\par  \par  \par  Kari NP \par

## 2024-04-10 RX ORDER — SEMAGLUTIDE 1.34 MG/ML
2 INJECTION, SOLUTION SUBCUTANEOUS
Refills: 0 | Status: ACTIVE | COMMUNITY

## 2024-04-10 RX ORDER — METFORMIN HYDROCHLORIDE 500 MG/1
500 TABLET, COATED ORAL
Qty: 60 | Refills: 0 | Status: DISCONTINUED | COMMUNITY
Start: 2022-07-22 | End: 2024-04-10

## 2024-04-10 RX ORDER — IPRATROPIUM BROMIDE 21 UG/1
0.03 SPRAY NASAL
Refills: 0 | Status: DISCONTINUED | COMMUNITY
End: 2024-04-10

## 2024-04-10 RX ORDER — ATORVASTATIN CALCIUM 40 MG/1
40 TABLET, FILM COATED ORAL
Qty: 90 | Refills: 0 | Status: DISCONTINUED | COMMUNITY
Start: 2022-03-25 | End: 2024-04-10

## 2024-04-11 RX ORDER — ATORVASTATIN CALCIUM 80 MG/1
80 TABLET, FILM COATED ORAL
Qty: 90 | Refills: 1 | Status: ACTIVE | COMMUNITY
Start: 2024-03-06 | End: 1900-01-01

## 2024-04-16 NOTE — STROKE CODE NOTE - IV ALTEPLASE DOOR HIDDEN
-- DO NOT REPLY / DO NOT REPLY ALL --  -- Message is from [facility] ([facility]) --    General Patient Message: Patient is calling in to check on the status of the referral with  she states the location will need to have the referral faxed over to [phone] in order for her to schedule the appointment      Caller Information         Type Contact Phone/Fax    04/16/2024 10:02 AM CDT Phone (Incoming) Bonnie Kemp (Self) [phone] (M)          Alternative phone number: na    Can a detailed message be left? Yes    Message Turnaround:     Is it Working Hours? Yes - Working Hours     {[place] -    Please give this turnaround time to the caller -   \"This message will be sent to [state Provider's name]. The clinical team will fulfill your request as soon as they review your message.\"                 show

## 2024-05-29 ENCOUNTER — APPOINTMENT (OUTPATIENT)
Dept: CARDIOLOGY | Facility: CLINIC | Age: 54
End: 2024-05-29

## 2024-07-19 ENCOUNTER — RX RENEWAL (OUTPATIENT)
Age: 54
End: 2024-07-19

## 2024-08-05 ENCOUNTER — RX RENEWAL (OUTPATIENT)
Age: 54
End: 2024-08-05

## 2025-02-06 ENCOUNTER — RX RENEWAL (OUTPATIENT)
Age: 55
End: 2025-02-06
